# Patient Record
Sex: FEMALE | Race: WHITE | NOT HISPANIC OR LATINO | Employment: OTHER | URBAN - METROPOLITAN AREA
[De-identification: names, ages, dates, MRNs, and addresses within clinical notes are randomized per-mention and may not be internally consistent; named-entity substitution may affect disease eponyms.]

---

## 2017-03-17 ENCOUNTER — HISTORICAL (OUTPATIENT)
Dept: RADIOLOGY | Facility: HOSPITAL | Age: 77
End: 2017-03-17

## 2017-04-07 ENCOUNTER — HISTORICAL (OUTPATIENT)
Dept: ADMINISTRATIVE | Facility: HOSPITAL | Age: 77
End: 2017-04-07

## 2017-07-18 ENCOUNTER — HISTORICAL (OUTPATIENT)
Dept: RADIOLOGY | Facility: HOSPITAL | Age: 77
End: 2017-07-18

## 2017-10-13 ENCOUNTER — HISTORICAL (OUTPATIENT)
Dept: LAB | Facility: HOSPITAL | Age: 77
End: 2017-10-13

## 2017-10-13 LAB
ABS NEUT (OLG): 5.21 X10(3)/MCL (ref 2.1–9.2)
ALBUMIN SERPL-MCNC: 3.7 GM/DL (ref 3.4–5)
ALBUMIN/GLOB SERPL: 1 {RATIO}
ALP SERPL-CCNC: 72 UNIT/L (ref 45–117)
ALT SERPL-CCNC: 38 UNIT/L (ref 13–56)
AST SERPL-CCNC: 23 UNIT/L (ref 15–37)
BASOPHILS # BLD AUTO: 0.05 X10(3)/MCL (ref 0–0.2)
BASOPHILS NFR BLD AUTO: 0.6 % (ref 0–1)
BILIRUB SERPL-MCNC: 0.8 MG/DL (ref 0.2–1)
BILIRUBIN DIRECT+TOT PNL SERPL-MCNC: 0.2 MG/DL (ref 0–0.2)
BILIRUBIN DIRECT+TOT PNL SERPL-MCNC: 0.6 MG/DL (ref 0–1)
BUN SERPL-MCNC: 22 MG/DL (ref 7–18)
CALCIUM SERPL-MCNC: 9.3 MG/DL (ref 8.5–10.1)
CHLORIDE SERPL-SCNC: 102 MMOL/L (ref 98–107)
CHOLEST SERPL-MCNC: 186 MG/DL (ref 0–200)
CHOLEST/HDLC SERPL: 2.8 {RATIO} (ref 0–4)
CO2 SERPL-SCNC: 33 MMOL/L (ref 21–32)
CREAT SERPL-MCNC: 0.74 MG/DL (ref 0.55–1.02)
EOSINOPHIL # BLD AUTO: 0.37 X10(3)/MCL (ref 0–0.9)
EOSINOPHIL NFR BLD AUTO: 4.4 % (ref 0–6.4)
ERYTHROCYTE [DISTWIDTH] IN BLOOD BY AUTOMATED COUNT: 12.5 % (ref 11.5–17)
GLOBULIN SER-MCNC: 4 GM/DL (ref 2–4)
GLUCOSE SERPL-MCNC: 108 MG/DL (ref 74–106)
HCT VFR BLD AUTO: 47.6 % (ref 37–47)
HDLC SERPL-MCNC: 66 MG/DL (ref 35–60)
HGB BLD-MCNC: 16.2 GM/DL (ref 12–16)
IMM GRANULOCYTES # BLD AUTO: 0.03 10*3/UL (ref 0–0.02)
IMM GRANULOCYTES NFR BLD AUTO: 0.4 % (ref 0–0.43)
LDLC SERPL CALC-MCNC: 91 MG/DL (ref 0–129)
LYMPHOCYTES # BLD AUTO: 2.04 X10(3)/MCL (ref 0.6–4.6)
LYMPHOCYTES NFR BLD AUTO: 24.3 % (ref 16–44)
MCH RBC QN AUTO: 30.8 PG (ref 27–31)
MCHC RBC AUTO-ENTMCNC: 34 GM/DL (ref 33–36)
MCV RBC AUTO: 90.5 FL (ref 80–94)
MONOCYTES # BLD AUTO: 0.68 X10(3)/MCL (ref 0.1–1.3)
MONOCYTES NFR BLD AUTO: 8.1 % (ref 4–12.1)
NEUTROPHILS # BLD AUTO: 5.21 X10(3)/MCL (ref 2.1–9.2)
NEUTROPHILS NFR BLD AUTO: 62.2 % (ref 43–73)
NRBC BLD AUTO-RTO: 0 % (ref 0–0.2)
PLATELET # BLD AUTO: 205 X10(3)/MCL (ref 130–400)
PMV BLD AUTO: 9.8 FL (ref 7.4–10.4)
POTASSIUM SERPL-SCNC: 3.6 MMOL/L (ref 3.5–5.1)
PROT SERPL-MCNC: 7.8 GM/DL (ref 6.4–8.2)
RBC # BLD AUTO: 5.26 X10(6)/MCL (ref 4.2–5.4)
SODIUM SERPL-SCNC: 142 MMOL/L (ref 136–145)
TRIGL SERPL-MCNC: 143 MG/DL (ref 30–150)
VLDLC SERPL CALC-MCNC: 29 MG/DL
WBC # SPEC AUTO: 8.4 X10(3)/MCL (ref 4.5–11.5)

## 2018-02-06 ENCOUNTER — HISTORICAL (OUTPATIENT)
Dept: RADIOLOGY | Facility: HOSPITAL | Age: 78
End: 2018-02-06

## 2018-02-23 ENCOUNTER — HISTORICAL (OUTPATIENT)
Dept: LAB | Facility: HOSPITAL | Age: 78
End: 2018-02-23

## 2018-03-20 ENCOUNTER — HISTORICAL (OUTPATIENT)
Dept: RADIOLOGY | Facility: HOSPITAL | Age: 78
End: 2018-03-20

## 2018-05-08 ENCOUNTER — HISTORICAL (OUTPATIENT)
Dept: LAB | Facility: HOSPITAL | Age: 78
End: 2018-05-08

## 2018-05-08 LAB
ABS NEUT (OLG): 8.45 X10(3)/MCL (ref 2.1–9.2)
ALBUMIN SERPL-MCNC: 3.8 GM/DL (ref 3.4–5)
ALBUMIN/GLOB SERPL: 1 {RATIO}
ALP SERPL-CCNC: 65 UNIT/L (ref 45–117)
ALT SERPL-CCNC: 37 UNIT/L (ref 13–56)
AST SERPL-CCNC: 20 UNIT/L (ref 15–37)
BASOPHILS # BLD AUTO: 0.07 X10(3)/MCL (ref 0–0.2)
BASOPHILS NFR BLD AUTO: 0.5 % (ref 0–1)
BILIRUB SERPL-MCNC: 0.5 MG/DL (ref 0.2–1)
BILIRUBIN DIRECT+TOT PNL SERPL-MCNC: 0.1 MG/DL (ref 0–0.2)
BILIRUBIN DIRECT+TOT PNL SERPL-MCNC: 0.4 MG/DL (ref 0–1)
BUN SERPL-MCNC: 27 MG/DL (ref 7–18)
CALCIUM SERPL-MCNC: 9.4 MG/DL (ref 8.5–10.1)
CHLORIDE SERPL-SCNC: 103 MMOL/L (ref 98–107)
CHOLEST SERPL-MCNC: 184 MG/DL (ref 0–200)
CHOLEST/HDLC SERPL: 2.5 {RATIO} (ref 0–4)
CO2 SERPL-SCNC: 33 MMOL/L (ref 21–32)
CREAT SERPL-MCNC: 0.87 MG/DL (ref 0.55–1.02)
EOSINOPHIL # BLD AUTO: 0.26 X10(3)/MCL (ref 0–0.9)
EOSINOPHIL NFR BLD AUTO: 1.9 % (ref 0–6.4)
ERYTHROCYTE [DISTWIDTH] IN BLOOD BY AUTOMATED COUNT: 12.9 % (ref 11.5–17)
GLOBULIN SER-MCNC: 4 GM/DL (ref 2–4)
GLUCOSE SERPL-MCNC: 89 MG/DL (ref 74–106)
HCT VFR BLD AUTO: 50 % (ref 37–47)
HDLC SERPL-MCNC: 75 MG/DL (ref 35–60)
HGB BLD-MCNC: 16.8 GM/DL (ref 12–16)
IMM GRANULOCYTES # BLD AUTO: 0.05 10*3/UL (ref 0–0.02)
IMM GRANULOCYTES NFR BLD AUTO: 0.4 % (ref 0–0.43)
LDLC SERPL CALC-MCNC: 74 MG/DL (ref 0–129)
LYMPHOCYTES # BLD AUTO: 3.53 X10(3)/MCL (ref 0.6–4.6)
LYMPHOCYTES NFR BLD AUTO: 25.9 % (ref 16–44)
MCH RBC QN AUTO: 31.3 PG (ref 27–31)
MCHC RBC AUTO-ENTMCNC: 33.6 GM/DL (ref 33–36)
MCV RBC AUTO: 93.3 FL (ref 80–94)
MONOCYTES # BLD AUTO: 1.27 X10(3)/MCL (ref 0.1–1.3)
MONOCYTES NFR BLD AUTO: 9.3 % (ref 4–12.1)
NEUTROPHILS # BLD AUTO: 8.45 X10(3)/MCL (ref 2.1–9.2)
NEUTROPHILS NFR BLD AUTO: 62 % (ref 43–73)
NRBC BLD AUTO-RTO: 0 % (ref 0–0.2)
PLATELET # BLD AUTO: 244 X10(3)/MCL (ref 130–400)
PMV BLD AUTO: 9.2 FL (ref 7.4–10.4)
POTASSIUM SERPL-SCNC: 4.2 MMOL/L (ref 3.5–5.1)
PROT SERPL-MCNC: 8.2 GM/DL (ref 6.4–8.2)
RBC # BLD AUTO: 5.36 X10(6)/MCL (ref 4.2–5.4)
SODIUM SERPL-SCNC: 141 MMOL/L (ref 136–145)
TRIGL SERPL-MCNC: 174 MG/DL (ref 30–150)
TSH SERPL-ACNC: 4.01 MIU/ML (ref 0.36–3.74)
VLDLC SERPL CALC-MCNC: 35 MG/DL
WBC # SPEC AUTO: 13.6 X10(3)/MCL (ref 4.5–11.5)

## 2018-07-25 ENCOUNTER — HISTORICAL (OUTPATIENT)
Dept: RADIOLOGY | Facility: HOSPITAL | Age: 78
End: 2018-07-25

## 2018-08-10 ENCOUNTER — HISTORICAL (OUTPATIENT)
Dept: LAB | Facility: HOSPITAL | Age: 78
End: 2018-08-10

## 2018-08-10 LAB
ABS NEUT (OLG): 4.66 X10(3)/MCL (ref 2.1–9.2)
ALBUMIN SERPL-MCNC: 3.7 GM/DL (ref 3.4–5)
ALBUMIN/GLOB SERPL: 1 {RATIO}
ALP SERPL-CCNC: 65 UNIT/L (ref 45–117)
ALT SERPL-CCNC: 52 UNIT/L (ref 13–56)
AST SERPL-CCNC: 48 UNIT/L (ref 15–37)
BASOPHILS # BLD AUTO: 0.06 X10(3)/MCL (ref 0–0.2)
BASOPHILS NFR BLD AUTO: 0.8 % (ref 0–1)
BILIRUB SERPL-MCNC: 0.8 MG/DL (ref 0.2–1)
BILIRUBIN DIRECT+TOT PNL SERPL-MCNC: 0.2 MG/DL (ref 0–0.2)
BILIRUBIN DIRECT+TOT PNL SERPL-MCNC: 0.6 MG/DL (ref 0–1)
BUN SERPL-MCNC: 24 MG/DL (ref 7–18)
CALCIUM SERPL-MCNC: 9.1 MG/DL (ref 8.5–10.1)
CHLORIDE SERPL-SCNC: 103 MMOL/L (ref 98–107)
CHOLEST SERPL-MCNC: 179 MG/DL (ref 0–200)
CHOLEST/HDLC SERPL: 2.8 {RATIO} (ref 0–4)
CO2 SERPL-SCNC: 32 MMOL/L (ref 21–32)
CREAT SERPL-MCNC: 0.7 MG/DL (ref 0.55–1.02)
EOSINOPHIL # BLD AUTO: 0.44 X10(3)/MCL (ref 0–0.9)
EOSINOPHIL NFR BLD AUTO: 5.7 % (ref 0–6.4)
ERYTHROCYTE [DISTWIDTH] IN BLOOD BY AUTOMATED COUNT: 13 % (ref 11.5–17)
GLOBULIN SER-MCNC: 4 GM/DL (ref 2–4)
GLUCOSE SERPL-MCNC: 117 MG/DL (ref 74–106)
HCT VFR BLD AUTO: 46.8 % (ref 37–47)
HDLC SERPL-MCNC: 63 MG/DL (ref 35–60)
HGB BLD-MCNC: 15.7 GM/DL (ref 12–16)
IMM GRANULOCYTES # BLD AUTO: 0.02 10*3/UL (ref 0–0.02)
IMM GRANULOCYTES NFR BLD AUTO: 0.3 % (ref 0–0.43)
LDLC SERPL CALC-MCNC: 90 MG/DL (ref 0–129)
LYMPHOCYTES # BLD AUTO: 1.93 X10(3)/MCL (ref 0.6–4.6)
LYMPHOCYTES NFR BLD AUTO: 24.9 % (ref 16–44)
MCH RBC QN AUTO: 30.7 PG (ref 27–31)
MCHC RBC AUTO-ENTMCNC: 33.5 GM/DL (ref 33–36)
MCV RBC AUTO: 91.4 FL (ref 80–94)
MONOCYTES # BLD AUTO: 0.65 X10(3)/MCL (ref 0.1–1.3)
MONOCYTES NFR BLD AUTO: 8.4 % (ref 4–12.1)
NEUTROPHILS # BLD AUTO: 4.66 X10(3)/MCL (ref 2.1–9.2)
NEUTROPHILS NFR BLD AUTO: 59.9 % (ref 43–73)
NRBC BLD AUTO-RTO: 0 % (ref 0–0.2)
PLATELET # BLD AUTO: 210 X10(3)/MCL (ref 130–400)
PMV BLD AUTO: 9.6 FL (ref 7.4–10.4)
POTASSIUM SERPL-SCNC: 3.6 MMOL/L (ref 3.5–5.1)
PROT SERPL-MCNC: 8 GM/DL (ref 6.4–8.2)
RBC # BLD AUTO: 5.12 X10(6)/MCL (ref 4.2–5.4)
SODIUM SERPL-SCNC: 139 MMOL/L (ref 136–145)
TRIGL SERPL-MCNC: 130 MG/DL (ref 30–150)
TSH SERPL-ACNC: 2.53 MIU/ML (ref 0.36–3.74)
VLDLC SERPL CALC-MCNC: 26 MG/DL
WBC # SPEC AUTO: 7.8 X10(3)/MCL (ref 4.5–11.5)

## 2018-08-17 ENCOUNTER — HISTORICAL (OUTPATIENT)
Dept: RADIOLOGY | Facility: HOSPITAL | Age: 78
End: 2018-08-17

## 2019-06-12 ENCOUNTER — HISTORICAL (OUTPATIENT)
Dept: ADMINISTRATIVE | Facility: HOSPITAL | Age: 79
End: 2019-06-12

## 2019-06-14 ENCOUNTER — HISTORICAL (OUTPATIENT)
Dept: ADMINISTRATIVE | Facility: HOSPITAL | Age: 79
End: 2019-06-14

## 2019-06-25 ENCOUNTER — HISTORICAL (OUTPATIENT)
Dept: LAB | Facility: HOSPITAL | Age: 79
End: 2019-06-25

## 2019-06-25 LAB
ABS NEUT (OLG): 4.15 X10(3)/MCL (ref 2.1–9.2)
ALBUMIN SERPL-MCNC: 3.5 GM/DL (ref 3.4–5)
ALBUMIN/GLOB SERPL: 0.9 RATIO (ref 1–2)
ALP SERPL-CCNC: 62 UNIT/L (ref 45–117)
ALT SERPL-CCNC: 30 UNIT/L (ref 13–56)
AST SERPL-CCNC: 23 UNIT/L (ref 15–37)
BASOPHILS # BLD AUTO: 0.04 X10(3)/MCL (ref 0–0.2)
BASOPHILS NFR BLD AUTO: 0.6 % (ref 0–1)
BILIRUB SERPL-MCNC: 0.6 MG/DL (ref 0.2–1)
BILIRUBIN DIRECT+TOT PNL SERPL-MCNC: 0.14 MG/DL (ref 0–0.2)
BILIRUBIN DIRECT+TOT PNL SERPL-MCNC: 0.46 MG/DL (ref 0–1)
BUN SERPL-MCNC: 18 MG/DL (ref 7–18)
CALCIUM SERPL-MCNC: 9.4 MG/DL (ref 8.5–10.1)
CHLORIDE SERPL-SCNC: 103 MMOL/L (ref 98–107)
CHOLEST SERPL-MCNC: 184 MG/DL (ref 0–199)
CHOLEST/HDLC SERPL: 3 MG/DL (ref 0–8)
CO2 SERPL-SCNC: 33 MMOL/L (ref 21–32)
CREAT SERPL-MCNC: 0.74 MG/DL (ref 0.55–1.02)
EOSINOPHIL # BLD AUTO: 0.38 X10(3)/MCL (ref 0–0.9)
EOSINOPHIL NFR BLD AUTO: 5.3 % (ref 0–6.4)
ERYTHROCYTE [DISTWIDTH] IN BLOOD BY AUTOMATED COUNT: 12.9 % (ref 11.5–17)
GLOBULIN SER-MCNC: 4 GM/DL (ref 2–4)
GLUCOSE SERPL-MCNC: 112 MG/DL (ref 74–106)
HCT VFR BLD AUTO: 44.4 % (ref 37–47)
HDLC SERPL-MCNC: 58 MG/DL
HGB BLD-MCNC: 15 GM/DL (ref 12–16)
IMM GRANULOCYTES # BLD AUTO: 0.02 10*3/UL (ref 0–0.02)
IMM GRANULOCYTES NFR BLD AUTO: 0.3 % (ref 0–0.43)
LDLC SERPL CALC-MCNC: 90 MG/DL (ref 0–129)
LYMPHOCYTES # BLD AUTO: 1.95 X10(3)/MCL (ref 0.6–4.6)
LYMPHOCYTES NFR BLD AUTO: 27.4 % (ref 16–44)
MCH RBC QN AUTO: 31.3 PG (ref 27–31)
MCHC RBC AUTO-ENTMCNC: 33.8 GM/DL (ref 33–36)
MCV RBC AUTO: 92.7 FL (ref 80–94)
MONOCYTES # BLD AUTO: 0.58 X10(3)/MCL (ref 0.1–1.3)
MONOCYTES NFR BLD AUTO: 8.1 % (ref 4–12.1)
NEUTROPHILS # BLD AUTO: 4.15 X10(3)/MCL (ref 2.1–9.2)
NEUTROPHILS NFR BLD AUTO: 58.3 % (ref 43–73)
NRBC BLD AUTO-RTO: 0 % (ref 0–0.2)
PLATELET # BLD AUTO: 196 X10(3)/MCL (ref 130–400)
PMV BLD AUTO: 9.3 FL (ref 7.4–10.4)
POTASSIUM SERPL-SCNC: 4 MMOL/L (ref 3.5–5.1)
PROT SERPL-MCNC: 7.6 GM/DL (ref 6.4–8.2)
RBC # BLD AUTO: 4.79 X10(6)/MCL (ref 4.2–5.4)
SODIUM SERPL-SCNC: 142 MMOL/L (ref 136–145)
TRIGL SERPL-MCNC: 181 MG/DL (ref 0–149)
TSH SERPL-ACNC: 2.54 MIU/ML (ref 0.36–3.74)
VLDLC SERPL CALC-MCNC: 36 MG/DL
WBC # SPEC AUTO: 7.1 X10(3)/MCL (ref 4.5–11.5)

## 2019-07-30 ENCOUNTER — HISTORICAL (OUTPATIENT)
Dept: RADIOLOGY | Facility: HOSPITAL | Age: 79
End: 2019-07-30

## 2019-09-04 ENCOUNTER — HISTORICAL (OUTPATIENT)
Dept: LAB | Facility: HOSPITAL | Age: 79
End: 2019-09-04

## 2019-09-04 LAB — URATE SERPL-MCNC: 7.5 MG/DL (ref 2.6–6)

## 2019-10-11 ENCOUNTER — HISTORICAL (OUTPATIENT)
Dept: LAB | Facility: HOSPITAL | Age: 79
End: 2019-10-11

## 2019-10-11 LAB
BUN SERPL-MCNC: 18 MG/DL (ref 7–18)
CALCIUM SERPL-MCNC: 9.7 MG/DL (ref 8.5–10.1)
CHLORIDE SERPL-SCNC: 104 MMOL/L (ref 98–107)
CHOLEST SERPL-MCNC: 212 MG/DL (ref 0–199)
CHOLEST/HDLC SERPL: 3 MG/DL (ref 0–8)
CO2 SERPL-SCNC: 34 MMOL/L (ref 21–32)
CREAT SERPL-MCNC: 0.82 MG/DL (ref 0.55–1.02)
CREAT/UREA NIT SERPL: 22
GLUCOSE SERPL-MCNC: 119 MG/DL (ref 74–106)
HDLC SERPL-MCNC: 68 MG/DL
LDLC SERPL CALC-MCNC: 112 MG/DL (ref 0–129)
POTASSIUM SERPL-SCNC: 4.1 MMOL/L (ref 3.5–5.1)
SODIUM SERPL-SCNC: 141 MMOL/L (ref 136–145)
TRIGL SERPL-MCNC: 160 MG/DL (ref 0–149)
VLDLC SERPL CALC-MCNC: 32 MG/DL

## 2020-01-24 ENCOUNTER — HISTORICAL (OUTPATIENT)
Dept: LAB | Facility: HOSPITAL | Age: 80
End: 2020-01-24

## 2020-01-24 LAB
ABS NEUT (OLG): 3.98 X10(3)/MCL (ref 2.1–9.2)
ALBUMIN SERPL-MCNC: 3.8 GM/DL (ref 3.4–5)
ALBUMIN/GLOB SERPL: 1 RATIO (ref 1–2)
ALP SERPL-CCNC: 64 UNIT/L (ref 45–117)
ALT SERPL-CCNC: 38 UNIT/L (ref 13–56)
AST SERPL-CCNC: 26 UNIT/L (ref 15–37)
BASOPHILS # BLD AUTO: 0.07 X10(3)/MCL (ref 0–0.2)
BASOPHILS NFR BLD AUTO: 0.9 % (ref 0–1)
BILIRUB SERPL-MCNC: 0.8 MG/DL (ref 0.2–1)
BILIRUBIN DIRECT+TOT PNL SERPL-MCNC: 0.17 MG/DL (ref 0–0.2)
BILIRUBIN DIRECT+TOT PNL SERPL-MCNC: 0.63 MG/DL (ref 0–1)
BUN SERPL-MCNC: 20 MG/DL (ref 7–18)
CALCIUM SERPL-MCNC: 10 MG/DL (ref 8.5–10.1)
CHLORIDE SERPL-SCNC: 102 MMOL/L (ref 98–107)
CHOLEST SERPL-MCNC: 189 MG/DL (ref 0–199)
CHOLEST/HDLC SERPL: 3 {RATIO}
CO2 SERPL-SCNC: 32 MMOL/L (ref 21–32)
CREAT SERPL-MCNC: 0.79 MG/DL (ref 0.55–1.02)
EOSINOPHIL # BLD AUTO: 0.38 X10(3)/MCL (ref 0–0.9)
EOSINOPHIL NFR BLD AUTO: 5.1 % (ref 0–6.4)
ERYTHROCYTE [DISTWIDTH] IN BLOOD BY AUTOMATED COUNT: 13.1 % (ref 11.5–17)
GLOBULIN SER-MCNC: 4 GM/DL (ref 2–4)
GLUCOSE SERPL-MCNC: 104 MG/DL (ref 74–106)
HCT VFR BLD AUTO: 46.9 % (ref 37–47)
HDLC SERPL-MCNC: 58 MG/DL
HGB BLD-MCNC: 15.7 GM/DL (ref 12–16)
IMM GRANULOCYTES # BLD AUTO: 0.02 10*3/UL (ref 0–0.02)
IMM GRANULOCYTES NFR BLD AUTO: 0.3 % (ref 0–0.43)
LDLC SERPL CALC-MCNC: 90 MG/DL (ref 0–129)
LYMPHOCYTES # BLD AUTO: 2.36 X10(3)/MCL (ref 0.6–4.6)
LYMPHOCYTES NFR BLD AUTO: 31.7 % (ref 16–44)
MCH RBC QN AUTO: 30.5 PG (ref 27–31)
MCHC RBC AUTO-ENTMCNC: 33.5 GM/DL (ref 33–36)
MCV RBC AUTO: 91.1 FL (ref 80–94)
MONOCYTES # BLD AUTO: 0.63 X10(3)/MCL (ref 0.1–1.3)
MONOCYTES NFR BLD AUTO: 8.5 % (ref 4–12.1)
NEUTROPHILS # BLD AUTO: 3.98 X10(3)/MCL (ref 2.1–9.2)
NEUTROPHILS NFR BLD AUTO: 53.5 % (ref 43–73)
NRBC BLD AUTO-RTO: 0 % (ref 0–0.2)
PLATELET # BLD AUTO: 200 X10(3)/MCL (ref 130–400)
PMV BLD AUTO: 10.1 FL (ref 7.4–10.4)
POTASSIUM SERPL-SCNC: 4 MMOL/L (ref 3.5–5.1)
PROT SERPL-MCNC: 7.9 GM/DL (ref 6.4–8.2)
RBC # BLD AUTO: 5.15 X10(6)/MCL (ref 4.2–5.4)
SODIUM SERPL-SCNC: 139 MMOL/L (ref 136–145)
TRIGL SERPL-MCNC: 205 MG/DL (ref 0–149)
VLDLC SERPL CALC-MCNC: 41 MG/DL
WBC # SPEC AUTO: 7.4 X10(3)/MCL (ref 4.5–11.5)

## 2020-05-07 ENCOUNTER — HISTORICAL (OUTPATIENT)
Dept: LAB | Facility: HOSPITAL | Age: 80
End: 2020-05-07

## 2020-05-12 ENCOUNTER — HISTORICAL (OUTPATIENT)
Dept: ADMINISTRATIVE | Facility: HOSPITAL | Age: 80
End: 2020-05-12

## 2020-05-20 ENCOUNTER — HISTORICAL (OUTPATIENT)
Dept: LAB | Facility: HOSPITAL | Age: 80
End: 2020-05-20

## 2020-05-20 LAB
ABS NEUT (OLG): 6.77 X10(3)/MCL (ref 2.1–9.2)
ALBUMIN SERPL-MCNC: 3.8 GM/DL (ref 3.4–4.8)
ALBUMIN/GLOB SERPL: 0.9 RATIO (ref 1.1–2)
ALP SERPL-CCNC: 68 UNIT/L (ref 40–150)
ALT SERPL-CCNC: 24 UNIT/L (ref 0–55)
AST SERPL-CCNC: 20 UNIT/L (ref 5–34)
BASOPHILS # BLD AUTO: 0.07 X10(3)/MCL (ref 0–0.2)
BASOPHILS NFR BLD AUTO: 0.7 % (ref 0–1)
BILIRUB SERPL-MCNC: 1 MG/DL (ref 0.2–1.2)
BILIRUBIN DIRECT+TOT PNL SERPL-MCNC: 0.4 MG/DL (ref 0–0.5)
BILIRUBIN DIRECT+TOT PNL SERPL-MCNC: 0.6 MG/DL (ref 0–0.8)
BUN SERPL-MCNC: 25.4 MG/DL (ref 9.8–20.1)
CALCIUM SERPL-MCNC: 10.7 MG/DL (ref 8.4–10.2)
CHLORIDE SERPL-SCNC: 100 MMOL/L (ref 98–107)
CHOLEST SERPL-MCNC: 181 MG/DL
CHOLEST/HDLC SERPL: 3 {RATIO} (ref 0–5)
CO2 SERPL-SCNC: 33 MMOL/L (ref 23–31)
CREAT SERPL-MCNC: 0.78 MG/DL (ref 0.57–1.11)
EOSINOPHIL # BLD AUTO: 0.33 X10(3)/MCL (ref 0–0.9)
EOSINOPHIL NFR BLD AUTO: 3.1 % (ref 0–6.4)
ERYTHROCYTE [DISTWIDTH] IN BLOOD BY AUTOMATED COUNT: 13 % (ref 11.5–17)
GLOBULIN SER-MCNC: 4.1 GM/DL (ref 2.4–3.5)
GLUCOSE SERPL-MCNC: 114 MG/DL (ref 82–115)
HCT VFR BLD AUTO: 45.7 % (ref 37–47)
HDLC SERPL-MCNC: 54 MG/DL (ref 40–60)
HGB BLD-MCNC: 15.3 GM/DL (ref 12–16)
IMM GRANULOCYTES # BLD AUTO: 0.04 10*3/UL (ref 0–0.02)
IMM GRANULOCYTES NFR BLD AUTO: 0.4 % (ref 0–0.43)
LDLC SERPL CALC-MCNC: 98 MG/DL (ref 50–140)
LYMPHOCYTES # BLD AUTO: 2.87 X10(3)/MCL (ref 0.6–4.6)
LYMPHOCYTES NFR BLD AUTO: 26.7 % (ref 16–44)
MCH RBC QN AUTO: 31 PG (ref 27–31)
MCHC RBC AUTO-ENTMCNC: 33.5 GM/DL (ref 33–36)
MCV RBC AUTO: 92.7 FL (ref 80–94)
MONOCYTES # BLD AUTO: 0.66 X10(3)/MCL (ref 0.1–1.3)
MONOCYTES NFR BLD AUTO: 6.1 % (ref 4–12.1)
NEUTROPHILS # BLD AUTO: 6.77 X10(3)/MCL (ref 2.1–9.2)
NEUTROPHILS NFR BLD AUTO: 63 % (ref 43–73)
NRBC BLD AUTO-RTO: 0 % (ref 0–0.2)
PLATELET # BLD AUTO: 269 X10(3)/MCL (ref 130–400)
PMV BLD AUTO: 9.2 FL (ref 7.4–10.4)
POTASSIUM SERPL-SCNC: 4.2 MMOL/L (ref 3.5–5.1)
PROT SERPL-MCNC: 7.9 GM/DL (ref 5.8–7.6)
RBC # BLD AUTO: 4.93 X10(6)/MCL (ref 4.2–5.4)
SODIUM SERPL-SCNC: 141 MMOL/L (ref 136–145)
TRIGL SERPL-MCNC: 147 MG/DL (ref 0–150)
VLDLC SERPL CALC-MCNC: 29 MG/DL
WBC # SPEC AUTO: 10.7 X10(3)/MCL (ref 4.5–11.5)

## 2020-06-19 ENCOUNTER — HISTORICAL (OUTPATIENT)
Dept: LAB | Facility: HOSPITAL | Age: 80
End: 2020-06-19

## 2020-06-19 LAB
ABS NEUT (OLG): 4.87 X10(3)/MCL (ref 2.1–9.2)
ALBUMIN SERPL-MCNC: 4 GM/DL (ref 3.4–4.8)
ALBUMIN/GLOB SERPL: 1.1 RATIO (ref 1.1–2)
ALP SERPL-CCNC: 66 UNIT/L (ref 40–150)
ALT SERPL-CCNC: 22 UNIT/L (ref 0–55)
AST SERPL-CCNC: 22 UNIT/L (ref 5–34)
BASOPHILS # BLD AUTO: 0.06 X10(3)/MCL (ref 0–0.2)
BASOPHILS NFR BLD AUTO: 0.7 % (ref 0–1)
BILIRUB SERPL-MCNC: 1 MG/DL (ref 0.2–1.2)
BILIRUBIN DIRECT+TOT PNL SERPL-MCNC: 0.4 MG/DL (ref 0–0.5)
BILIRUBIN DIRECT+TOT PNL SERPL-MCNC: 0.6 MG/DL (ref 0–0.8)
BUN SERPL-MCNC: 18.6 MG/DL (ref 9.8–20.1)
CALCIUM SERPL-MCNC: 10.8 MG/DL (ref 8.4–10.2)
CHLORIDE SERPL-SCNC: 100 MMOL/L (ref 98–107)
CHOLEST SERPL-MCNC: 232 MG/DL
CHOLEST/HDLC SERPL: 4 {RATIO} (ref 0–5)
CO2 SERPL-SCNC: 30 MMOL/L (ref 23–31)
CREAT SERPL-MCNC: 0.77 MG/DL (ref 0.57–1.11)
EOSINOPHIL # BLD AUTO: 0.37 X10(3)/MCL (ref 0–0.9)
EOSINOPHIL NFR BLD AUTO: 4.2 % (ref 0–6.4)
ERYTHROCYTE [DISTWIDTH] IN BLOOD BY AUTOMATED COUNT: 12.8 % (ref 11.5–17)
GLOBULIN SER-MCNC: 3.8 GM/DL (ref 2.4–3.5)
GLUCOSE SERPL-MCNC: 104 MG/DL (ref 82–115)
HCT VFR BLD AUTO: 47.2 % (ref 37–47)
HDLC SERPL-MCNC: 58 MG/DL (ref 40–60)
HGB BLD-MCNC: 16 GM/DL (ref 12–16)
IMM GRANULOCYTES # BLD AUTO: 0.02 10*3/UL (ref 0–0.02)
IMM GRANULOCYTES NFR BLD AUTO: 0.2 % (ref 0–0.43)
LDLC SERPL CALC-MCNC: 146 MG/DL (ref 50–140)
LYMPHOCYTES # BLD AUTO: 2.89 X10(3)/MCL (ref 0.6–4.6)
LYMPHOCYTES NFR BLD AUTO: 32.6 % (ref 16–44)
MCH RBC QN AUTO: 31.3 PG (ref 27–31)
MCHC RBC AUTO-ENTMCNC: 33.9 GM/DL (ref 33–36)
MCV RBC AUTO: 92.4 FL (ref 80–94)
MONOCYTES # BLD AUTO: 0.66 X10(3)/MCL (ref 0.1–1.3)
MONOCYTES NFR BLD AUTO: 7.4 % (ref 4–12.1)
NEUTROPHILS # BLD AUTO: 4.87 X10(3)/MCL (ref 2.1–9.2)
NEUTROPHILS NFR BLD AUTO: 54.9 % (ref 43–73)
NRBC BLD AUTO-RTO: 0 % (ref 0–0.2)
PLATELET # BLD AUTO: 244 X10(3)/MCL (ref 130–400)
PMV BLD AUTO: 9.2 FL (ref 7.4–10.4)
POTASSIUM SERPL-SCNC: 3.6 MMOL/L (ref 3.5–5.1)
PROT SERPL-MCNC: 7.8 GM/DL (ref 5.8–7.6)
RBC # BLD AUTO: 5.11 X10(6)/MCL (ref 4.2–5.4)
SODIUM SERPL-SCNC: 142 MMOL/L (ref 136–145)
TRIGL SERPL-MCNC: 139 MG/DL (ref 0–150)
VLDLC SERPL CALC-MCNC: 28 MG/DL
WBC # SPEC AUTO: 8.9 X10(3)/MCL (ref 4.5–11.5)

## 2020-07-21 ENCOUNTER — HISTORICAL (OUTPATIENT)
Dept: LAB | Facility: HOSPITAL | Age: 80
End: 2020-07-21

## 2020-07-21 LAB
APPEARANCE, UA: CLEAR
BILIRUB UR QL STRIP: NEGATIVE
COLOR UR: NORMAL
GLUCOSE (UA): NEGATIVE
HGB UR QL STRIP: NEGATIVE
KETONES UR QL STRIP: NEGATIVE
LEUKOCYTE ESTERASE UR QL STRIP: NEGATIVE
NITRITE UR QL STRIP: NEGATIVE
PH UR STRIP: 6 [PH] (ref 5–7)
PROT UR QL STRIP: NEGATIVE
SP GR UR STRIP: 1.02 (ref 1–1.03)
UROBILINOGEN UR STRIP-ACNC: NEGATIVE

## 2020-08-04 ENCOUNTER — HISTORICAL (OUTPATIENT)
Dept: RADIOLOGY | Facility: HOSPITAL | Age: 80
End: 2020-08-04

## 2020-09-16 ENCOUNTER — HISTORICAL (OUTPATIENT)
Dept: LAB | Facility: HOSPITAL | Age: 80
End: 2020-09-16

## 2020-09-16 LAB
ABS NEUT (OLG): 4.77 X10(3)/MCL (ref 2.1–9.2)
ALBUMIN SERPL-MCNC: 4 GM/DL (ref 3.4–4.8)
ALBUMIN/GLOB SERPL: 1.1 RATIO (ref 1.1–2)
ALP SERPL-CCNC: 56 UNIT/L (ref 40–150)
ALT SERPL-CCNC: 26 UNIT/L (ref 0–55)
AST SERPL-CCNC: 27 UNIT/L (ref 5–34)
BASOPHILS # BLD AUTO: 0.06 X10(3)/MCL (ref 0–0.2)
BASOPHILS NFR BLD AUTO: 0.7 % (ref 0–1)
BILIRUB SERPL-MCNC: 0.8 MG/DL (ref 0.2–1.2)
BILIRUBIN DIRECT+TOT PNL SERPL-MCNC: 0.3 MG/DL (ref 0–0.5)
BILIRUBIN DIRECT+TOT PNL SERPL-MCNC: 0.5 MG/DL (ref 0–0.8)
BUN SERPL-MCNC: 20.3 MG/DL (ref 9.8–20.1)
CALCIUM SERPL-MCNC: 10.7 MG/DL (ref 8.4–10.2)
CHLORIDE SERPL-SCNC: 102 MMOL/L (ref 98–107)
CHOLEST SERPL-MCNC: 209 MG/DL
CHOLEST/HDLC SERPL: 3 {RATIO} (ref 0–5)
CO2 SERPL-SCNC: 30 MMOL/L (ref 23–31)
CREAT SERPL-MCNC: 0.79 MG/DL (ref 0.57–1.11)
EOSINOPHIL # BLD AUTO: 0.26 X10(3)/MCL (ref 0–0.9)
EOSINOPHIL NFR BLD AUTO: 3.1 % (ref 0–6.4)
ERYTHROCYTE [DISTWIDTH] IN BLOOD BY AUTOMATED COUNT: 13.3 % (ref 11.5–17)
FT4I SERPL CALC-MCNC: 2.53 (ref 2.6–3.6)
GLOBULIN SER-MCNC: 3.6 GM/DL (ref 2.4–3.5)
GLUCOSE SERPL-MCNC: 102 MG/DL (ref 82–115)
HCT VFR BLD AUTO: 48.3 % (ref 37–47)
HDLC SERPL-MCNC: 60 MG/DL (ref 40–60)
HGB BLD-MCNC: 16.2 GM/DL (ref 12–16)
IMM GRANULOCYTES # BLD AUTO: 0.02 10*3/UL (ref 0–0.02)
IMM GRANULOCYTES NFR BLD AUTO: 0.2 % (ref 0–0.43)
LDLC SERPL CALC-MCNC: 117 MG/DL (ref 50–140)
LYMPHOCYTES # BLD AUTO: 2.79 X10(3)/MCL (ref 0.6–4.6)
LYMPHOCYTES NFR BLD AUTO: 32.8 % (ref 16–44)
MCH RBC QN AUTO: 31.7 PG (ref 27–31)
MCHC RBC AUTO-ENTMCNC: 33.5 GM/DL (ref 33–36)
MCV RBC AUTO: 94.5 FL (ref 80–94)
MONOCYTES # BLD AUTO: 0.6 X10(3)/MCL (ref 0.1–1.3)
MONOCYTES NFR BLD AUTO: 7.1 % (ref 4–12.1)
NEUTROPHILS # BLD AUTO: 4.77 X10(3)/MCL (ref 2.1–9.2)
NEUTROPHILS NFR BLD AUTO: 56.1 % (ref 43–73)
NRBC BLD AUTO-RTO: 0 % (ref 0–0.2)
PLATELET # BLD AUTO: 223 X10(3)/MCL (ref 130–400)
PMV BLD AUTO: 9.4 FL (ref 7.4–10.4)
POTASSIUM SERPL-SCNC: 4 MMOL/L (ref 3.5–5.1)
PROT SERPL-MCNC: 7.6 GM/DL (ref 5.8–7.6)
RBC # BLD AUTO: 5.11 X10(6)/MCL (ref 4.2–5.4)
SODIUM SERPL-SCNC: 143 MMOL/L (ref 136–145)
T3RU NFR SERPL: 34.52 % (ref 31–39)
T4 SERPL-MCNC: 7.32 UG/DL (ref 4.87–11.72)
TRIGL SERPL-MCNC: 158 MG/DL (ref 0–150)
TSH SERPL-ACNC: 2.63 UIU/ML (ref 0.35–4.94)
VLDLC SERPL CALC-MCNC: 32 MG/DL
WBC # SPEC AUTO: 8.5 X10(3)/MCL (ref 4.5–11.5)

## 2020-11-02 ENCOUNTER — HISTORICAL (OUTPATIENT)
Dept: ADMINISTRATIVE | Facility: HOSPITAL | Age: 80
End: 2020-11-02

## 2020-11-02 LAB
APPEARANCE, UA: CLEAR
BACTERIA SPEC CULT: ABNORMAL /HPF
BILIRUB UR QL STRIP: NEGATIVE
COLOR UR: YELLOW
GLUCOSE (UA): NEGATIVE
HGB UR QL STRIP: NEGATIVE
KETONES UR QL STRIP: NEGATIVE
LEUKOCYTE ESTERASE UR QL STRIP: ABNORMAL
NITRITE UR QL STRIP: NEGATIVE
PH UR STRIP: 5.5 [PH] (ref 5–9)
PROT UR QL STRIP: NEGATIVE
RBC #/AREA URNS HPF: ABNORMAL /[HPF]
SP GR UR STRIP: 1.02 (ref 1–1.03)
SQUAMOUS EPITHELIAL, UA: ABNORMAL
UROBILINOGEN UR STRIP-ACNC: 0.2
WBC #/AREA URNS HPF: ABNORMAL /[HPF]

## 2020-12-17 ENCOUNTER — HISTORICAL (OUTPATIENT)
Dept: ADMINISTRATIVE | Facility: HOSPITAL | Age: 80
End: 2020-12-17

## 2020-12-17 LAB
ABS NEUT (OLG): 5.09 X10(3)/MCL (ref 2.1–9.2)
ALBUMIN SERPL-MCNC: 4.3 GM/DL (ref 3.4–4.8)
ALBUMIN/GLOB SERPL: 1.2 RATIO (ref 1.1–2)
ALP SERPL-CCNC: 73 UNIT/L (ref 40–150)
ALT SERPL-CCNC: 27 UNIT/L (ref 0–55)
APPEARANCE, UA: ABNORMAL
AST SERPL-CCNC: 23 UNIT/L (ref 5–34)
BACTERIA SPEC CULT: ABNORMAL /HPF
BASOPHILS # BLD AUTO: 0.1 X10(3)/MCL (ref 0–0.2)
BASOPHILS NFR BLD AUTO: 1 %
BILIRUB SERPL-MCNC: 0.8 MG/DL
BILIRUB UR QL STRIP: NEGATIVE
BILIRUBIN DIRECT+TOT PNL SERPL-MCNC: 0.3 MG/DL (ref 0–0.5)
BILIRUBIN DIRECT+TOT PNL SERPL-MCNC: 0.5 MG/DL (ref 0–0.8)
BUN SERPL-MCNC: 24.3 MG/DL (ref 9.8–20.1)
CALCIUM SERPL-MCNC: 10.5 MG/DL (ref 8.4–10.2)
CHLORIDE SERPL-SCNC: 101 MMOL/L (ref 98–107)
CHOLEST SERPL-MCNC: 227 MG/DL
CHOLEST/HDLC SERPL: 4 {RATIO} (ref 0–5)
CO2 SERPL-SCNC: 34 MMOL/L (ref 23–31)
COLOR UR: ABNORMAL
CREAT SERPL-MCNC: 0.75 MG/DL (ref 0.55–1.02)
DEPRECATED CALCIDIOL+CALCIFEROL SERPL-MC: 96.6 NG/ML (ref 30–80)
EOSINOPHIL # BLD AUTO: 0.4 X10(3)/MCL (ref 0–0.9)
EOSINOPHIL NFR BLD AUTO: 4 %
ERYTHROCYTE [DISTWIDTH] IN BLOOD BY AUTOMATED COUNT: 12.2 % (ref 11.5–17)
GLOBULIN SER-MCNC: 3.5 GM/DL (ref 2.4–3.5)
GLUCOSE (UA): NEGATIVE
GLUCOSE SERPL-MCNC: 97 MG/DL (ref 82–115)
HCT VFR BLD AUTO: 48.6 % (ref 37–47)
HDLC SERPL-MCNC: 57 MG/DL (ref 35–60)
HGB BLD-MCNC: 16.4 GM/DL (ref 12–16)
HGB UR QL STRIP: NEGATIVE
KETONES UR QL STRIP: NEGATIVE
LDLC SERPL CALC-MCNC: 137 MG/DL (ref 50–140)
LEUKOCYTE ESTERASE UR QL STRIP: ABNORMAL
LYMPHOCYTES # BLD AUTO: 2.4 X10(3)/MCL (ref 0.6–4.6)
LYMPHOCYTES NFR BLD AUTO: 28 %
MCH RBC QN AUTO: 32.9 PG (ref 27–31)
MCHC RBC AUTO-ENTMCNC: 33.7 GM/DL (ref 33–36)
MCV RBC AUTO: 97.6 FL (ref 80–94)
MONOCYTES # BLD AUTO: 0.7 X10(3)/MCL (ref 0.1–1.3)
MONOCYTES NFR BLD AUTO: 8 %
NEUTROPHILS # BLD AUTO: 5.09 X10(3)/MCL (ref 2.1–9.2)
NEUTROPHILS NFR BLD AUTO: 58 %
NITRITE UR QL STRIP: NEGATIVE
PH UR STRIP: 5.5 [PH] (ref 5–9)
PLATELET # BLD AUTO: 219 X10(3)/MCL (ref 130–400)
PMV BLD AUTO: 10 FL (ref 9.4–12.4)
POTASSIUM SERPL-SCNC: 5.1 MMOL/L (ref 3.5–5.1)
PROT SERPL-MCNC: 7.8 GM/DL (ref 5.8–7.6)
PROT UR QL STRIP: NEGATIVE
RBC # BLD AUTO: 4.98 X10(6)/MCL (ref 4.2–5.4)
RBC #/AREA URNS HPF: ABNORMAL /[HPF]
SODIUM SERPL-SCNC: 143 MMOL/L (ref 136–145)
SP GR UR STRIP: 1.03 (ref 1–1.03)
SQUAMOUS EPITHELIAL, UA: ABNORMAL
TRIGL SERPL-MCNC: 167 MG/DL (ref 37–140)
UROBILINOGEN UR STRIP-ACNC: 0.2
VLDLC SERPL CALC-MCNC: 33 MG/DL
WBC # SPEC AUTO: 8.7 X10(3)/MCL (ref 4.5–11.5)
WBC #/AREA URNS HPF: ABNORMAL /HPF

## 2021-03-10 ENCOUNTER — HISTORICAL (OUTPATIENT)
Dept: ADMINISTRATIVE | Facility: HOSPITAL | Age: 81
End: 2021-03-10

## 2021-03-10 LAB
ABS NEUT (OLG): 4.05 X10(3)/MCL (ref 2.1–9.2)
ALBUMIN SERPL-MCNC: 3.8 GM/DL (ref 3.4–4.8)
ALBUMIN/GLOB SERPL: 1.2 RATIO (ref 1.1–2)
ALP SERPL-CCNC: 75 UNIT/L (ref 40–150)
ALT SERPL-CCNC: 32 UNIT/L (ref 0–55)
APPEARANCE, UA: CLEAR
AST SERPL-CCNC: 29 UNIT/L (ref 5–34)
BACTERIA #/AREA URNS AUTO: ABNORMAL /HPF
BASOPHILS # BLD AUTO: 0.1 X10(3)/MCL (ref 0–0.2)
BASOPHILS NFR BLD AUTO: 1 %
BILIRUB SERPL-MCNC: 0.7 MG/DL
BILIRUB UR QL STRIP: NEGATIVE
BILIRUBIN DIRECT+TOT PNL SERPL-MCNC: 0.3 MG/DL (ref 0–0.5)
BILIRUBIN DIRECT+TOT PNL SERPL-MCNC: 0.4 MG/DL (ref 0–0.8)
BUN SERPL-MCNC: 17.6 MG/DL (ref 9.8–20.1)
CALCIUM SERPL-MCNC: 9 MG/DL (ref 8.4–10.2)
CHLORIDE SERPL-SCNC: 104 MMOL/L (ref 98–107)
CHOLEST SERPL-MCNC: 197 MG/DL
CHOLEST/HDLC SERPL: 4 {RATIO} (ref 0–5)
CO2 SERPL-SCNC: 28 MMOL/L (ref 23–31)
COLOR UR: YELLOW
CREAT SERPL-MCNC: 0.65 MG/DL (ref 0.55–1.02)
DEPRECATED CALCIDIOL+CALCIFEROL SERPL-MC: 65.4 NG/ML (ref 30–80)
EOSINOPHIL # BLD AUTO: 0.4 X10(3)/MCL (ref 0–0.9)
EOSINOPHIL NFR BLD AUTO: 5 %
ERYTHROCYTE [DISTWIDTH] IN BLOOD BY AUTOMATED COUNT: 12.4 % (ref 11.5–17)
GLOBULIN SER-MCNC: 3.3 GM/DL (ref 2.4–3.5)
GLUCOSE (UA): NEGATIVE
GLUCOSE SERPL-MCNC: 95 MG/DL (ref 82–115)
HCT VFR BLD AUTO: 46.7 % (ref 37–47)
HDLC SERPL-MCNC: 54 MG/DL (ref 35–60)
HGB BLD-MCNC: 15.3 GM/DL (ref 12–16)
HGB UR QL STRIP: NEGATIVE
KETONES UR QL STRIP: NEGATIVE
LDLC SERPL CALC-MCNC: 108 MG/DL (ref 50–140)
LEUKOCYTE ESTERASE UR QL STRIP: ABNORMAL
LYMPHOCYTES # BLD AUTO: 2.3 X10(3)/MCL (ref 0.6–4.6)
LYMPHOCYTES NFR BLD AUTO: 31 %
MCH RBC QN AUTO: 32.8 PG (ref 27–31)
MCHC RBC AUTO-ENTMCNC: 32.8 GM/DL (ref 33–36)
MCV RBC AUTO: 100 FL (ref 80–94)
MONOCYTES # BLD AUTO: 0.6 X10(3)/MCL (ref 0.1–1.3)
MONOCYTES NFR BLD AUTO: 8 %
NEUTROPHILS # BLD AUTO: 4.05 X10(3)/MCL (ref 2.1–9.2)
NEUTROPHILS NFR BLD AUTO: 54 %
NITRITE UR QL STRIP.AUTO: NEGATIVE
PH UR STRIP: 5.5 [PH] (ref 5–9)
PLATELET # BLD AUTO: 190 X10(3)/MCL (ref 130–400)
PMV BLD AUTO: 10.7 FL (ref 9.4–12.4)
POTASSIUM SERPL-SCNC: 4.1 MMOL/L (ref 3.5–5.1)
PROT SERPL-MCNC: 7.1 GM/DL (ref 5.8–7.6)
PROT UR QL STRIP: NEGATIVE
RBC # BLD AUTO: 4.67 X10(6)/MCL (ref 4.2–5.4)
RBC #/AREA URNS HPF: ABNORMAL /[HPF]
SODIUM SERPL-SCNC: 144 MMOL/L (ref 136–145)
SP GR UR STRIP: 1.02 (ref 1–1.03)
SQUAMOUS EPITHELIAL, UA: ABNORMAL
TRIGL SERPL-MCNC: 175 MG/DL (ref 37–140)
UROBILINOGEN UR STRIP-ACNC: 0.2
VLDLC SERPL CALC-MCNC: 35 MG/DL
WBC # SPEC AUTO: 7.5 X10(3)/MCL (ref 4.5–11.5)
WBC #/AREA URNS AUTO: 11 /HPF (ref 0–3)

## 2021-03-15 ENCOUNTER — HISTORICAL (OUTPATIENT)
Dept: ADMINISTRATIVE | Facility: HOSPITAL | Age: 81
End: 2021-03-15

## 2021-10-18 ENCOUNTER — HISTORICAL (OUTPATIENT)
Dept: ADMINISTRATIVE | Facility: HOSPITAL | Age: 81
End: 2021-10-18

## 2021-10-18 LAB
BUN SERPL-MCNC: 16.2 MG/DL (ref 9.8–20.1)
CALCIUM SERPL-MCNC: 9.3 MG/DL (ref 8.7–10.5)
CHLORIDE SERPL-SCNC: 106 MMOL/L (ref 98–107)
CO2 SERPL-SCNC: 27 MMOL/L (ref 23–31)
CREAT SERPL-MCNC: 0.69 MG/DL (ref 0.55–1.02)
CREAT/UREA NIT SERPL: 23
GLUCOSE SERPL-MCNC: 134 MG/DL (ref 82–115)
POTASSIUM SERPL-SCNC: 4.4 MMOL/L (ref 3.5–5.1)
SODIUM SERPL-SCNC: 143 MMOL/L (ref 136–145)

## 2021-10-20 ENCOUNTER — HISTORICAL (OUTPATIENT)
Dept: RADIOLOGY | Facility: HOSPITAL | Age: 81
End: 2021-10-20

## 2021-12-06 ENCOUNTER — HISTORICAL (OUTPATIENT)
Dept: ADMINISTRATIVE | Facility: HOSPITAL | Age: 81
End: 2021-12-06

## 2021-12-06 LAB
BUN SERPL-MCNC: 24.8 MG/DL (ref 9.8–20.1)
CALCIUM SERPL-MCNC: 9.4 MG/DL (ref 8.7–10.5)
CHLORIDE SERPL-SCNC: 100 MMOL/L (ref 98–107)
CO2 SERPL-SCNC: 27 MMOL/L (ref 23–31)
CREAT SERPL-MCNC: 1.31 MG/DL (ref 0.55–1.02)
CREAT/UREA NIT SERPL: 19
GLUCOSE SERPL-MCNC: 83 MG/DL (ref 82–115)
POTASSIUM SERPL-SCNC: 4.4 MMOL/L (ref 3.5–5.1)
SODIUM SERPL-SCNC: 139 MMOL/L (ref 136–145)

## 2021-12-08 ENCOUNTER — HISTORICAL (OUTPATIENT)
Dept: ADMINISTRATIVE | Facility: HOSPITAL | Age: 81
End: 2021-12-08

## 2021-12-22 ENCOUNTER — HISTORICAL (OUTPATIENT)
Dept: ADMINISTRATIVE | Facility: HOSPITAL | Age: 81
End: 2021-12-22

## 2022-01-12 ENCOUNTER — HISTORICAL (OUTPATIENT)
Dept: ADMINISTRATIVE | Facility: HOSPITAL | Age: 82
End: 2022-01-12

## 2022-04-10 ENCOUNTER — HISTORICAL (OUTPATIENT)
Dept: ADMINISTRATIVE | Facility: HOSPITAL | Age: 82
End: 2022-04-10
Payer: MEDICARE

## 2022-04-24 VITALS
OXYGEN SATURATION: 96 % | HEIGHT: 64 IN | DIASTOLIC BLOOD PRESSURE: 58 MMHG | BODY MASS INDEX: 35 KG/M2 | WEIGHT: 205 LBS | SYSTOLIC BLOOD PRESSURE: 128 MMHG

## 2022-04-30 NOTE — OP NOTE
Patient:   Renata Melchor             MRN: [id number]            FIN: [id number]               Age:   81 years     Sex:  Female     :  1940   Associated Diagnoses:   None   Author:   Desmond Thomas MD          Preoperative Diagnosis: Nuclear sclerotic cataract [Left /  eye.    Postoperative Diagnosis: Same.    Anesthesia: Local    Procedure: Phacoemulsification of cataract with posterior chamber implant of the [Left/ eye.    This patient is a [  ] year old who was given a diagnosis of severe cataracts of both eyes with [  ] vision [  ]. The risks and benefits of cataract surgery were explained; the patient was consented and desired to have the surgery done. The patient was given topical anesthesia using 1% Lidocaine Jelly and the patient was prepped and draped in sterile fashion.    The microscope was centered and focused in a temporal position and a super sharp blade was used to make a paracentesis in the corneal limbus. Dispersive Viscoelastic was then placed in the anterior chamber. A 2.4 mm keratome blade was used to enter the anterior chamber in a self-sealing type technique. The cystatome blade was then used to initiate a capsulorrhexis which was completed 360 degrees with Utrata forceps. BSS in an AC cannula was then used to perform hydrodissection and hydrodilineation. Phacoemulsification was then accomplished by creating a deep groove down the middle of the nucleus, then  it into 2 halves with the phacotip and the Mata chopper and finishing the removal with a stop and chop technique.  The cortex was then removed using the I and A unit. All the lens material was removed without any tears to the anterior or posterior capsule. Cohesive Viscoelastic was then injected to inflate the capsular bag. A foldable lens was then injected into the capsular bag. The lens was observed to be securely placed into the bag. The I and A was then used to remove the remaining viscoelastic. A 10-0 Biosorb  incisional suture was not] placed. BSS through an A/C cannula was used to perform stromal hydration in the wound. BSS was also used again to reform the chamber and bring the eye to physiologic IOP.  The wound was checked for leaks and none were found. Copious Vigomox drop and 1-2 drops of, Prednisolone Acetate 1% were placed topically prior to removing the lid specular and drapes.  The drapes were then removed. The patient will be sent to recovery and instructed to use Vigamox, Ketorolac, and Predinsolone Acetate 1% three times a day as well as follow all instructions on the postoperative sheet given to them and explained after surgery. The patient will return to see Dr. Thomas at their scheduled appointment within 24-36 hours after surgery.      Desmond Thomas M.D.              NIMA/donna           [date / time]

## 2022-04-30 NOTE — OP NOTE
Patient:   Claudia Melchor            MRN: [id number]            FIN: [id number]               Age:   80 years     Sex:  Female     :  1940   Associated Diagnoses:   None   Author:   Tk Sanders MD      Preoperative diagnosis: Cataract of the right  eye     Postoperative diagnosis: Same     Operative procedure: Cataract extraction with intraocular lens implant    Lens Style: [id number]    The patient was brought to the operating theater by wheelchair and under light sedation given topical anesthesia using 0.75% Marcaine.  After adequate anesthesia the patient was then prepped and draped in usual ophthalmological manner.   Ludmila lid speculums were applied and under the surgical microscope a keratome incision was made temporally using a mary keratome blade and a 15° mary keratome stab incision was made in the superior nasal quadrant.  Viscoat was instilled into the anterior chamber and an anterior capsulorrhexis was performed using a bent 25-gauge needle and the anterior capsule flap withdrawn with Kelman forceps. Using an irrigating Kelman cystotome the nucleus was irrigated and rocked free from the cortical bed and the handpiece was withdrawn. The phacoemulsification handpiece was introduced into the eye and phacoemulsification carried out the posterior chamber and the iris plane while a nucleus manipulator was used to direct the nucleus fragments  towards the phacoemulsification tip and prevent corneal contact. After phacoemulsification of the nucleus was completed the handpiece was withdrawn and an irrigation-aspiration handpiece was introduced into the eye and all visible cortical fragments were aspirated from the eye without difficulty. The handpiece was withdrawn and Healon was introduced into the eye to inflate the anterior chamber and the capsular bag.  An intraocular lens implant was selected, inspected, folded and placed into the lens injector and then the lens carefully  injected into the capsular bag while the haptics were positioned using the nucleus manipulator. The Healon was then aspirated from the eye using an irrigation-aspiration handpiece and the handpiece withdrawn from the eye. The anterior chamber was inflated with balanced salt solution and the wound checked for water tightness and found to be intact.  The antibiotic drops used preoperatively were instilled into the eye and the patient sent to the outpatient recovery in good condition.

## 2022-05-03 NOTE — HISTORICAL [facility]ER
This is a historical note converted from Debra. Formatting and pictures may have been removed.  Please reference Debra for original formatting and attached multimedia. Chief Complaint  Right shoulder pain x 1 month. Pt states RTC sx about 8 years ago. Pt states increased pain when moving and lifting arm. Pain level 7 out of 10...NG  History of Present Illness  Pain getting WORST for 2 months?? Pain right Shoulder laterally ?? No fever ?? No chills  ?? Pain in the right shoulder in the subacromial region ?? In the supraspinatus region ?? When actively and passively moved ?? ?? Started gradually ?? Slowly worsens with extended activity ?? Worsens at night ?? Causing an inability to sleep ?? Causes difficulty lying on affected side ?? Feels better after rest ?? Not relieved by medication ?? Shoulder joint stiffness on the right ?? joint stiffness ???? No radicular arm pain ?? No right sternoclavicular joint pain ?? No right shoulder joint swelling ?? No pain in the acromioclavicular  ? ?? No tingling of the right shoulder ?? No right shoulder numbness  ?? Range of motion was abnormal difficulty reaching over head using right arm ?? Range of motion was abnormal difficulty combing hair using right arm ?? Range of motion was abnormal difficulty taking shirt on/off using right arm  ?? No neck pain on right Percervical pain ?? Not in the trapezius Trapezius pain  Pain is significantly effecting quality of life  Review of Systems  Review of Systems?  ?????Constitutional: ?No fever, No chills. ?  ?????Respiratory: ?No shortness of breath, No cough. ?  ?????Cardiovascular: ?No chest pain. ?  ?????Gastrointestinal: ?No nausea, No vomiting, No diarrhea, No constipation, No heartburn. ?  ?????Genitourinary: ?No dysuria, No hematuria. ?  ?????Hematology/Lymphatics: ?No bleeding tendency. ?  ?????Endocrine: ?No polyuria. ?  ?????Neurologic: ?Alert and oriented X4, No numbness, No tingling. ?  ?????Psychiatric: ?No anxiety, No  depression. ?  ?????Integumentary: no abnormalities except as noted in history of present illness  Physical Exam  Vitals & Measurements  HT:?162.00?cm? WT:?93.000?kg? BMI:?35.44?  ?  Shoulder:  ?   ??NO atrophy of the deltoid muscle  ? atrophy of the supraspinatus muscle  ? atrophy of the infraspinatus muscle  ?  ???  Right Shoulder:??. ? Acromial tenderness on palpation. ? Tenderness on palpation of the subacromial bursa. ? Tenderness on palpation of the deltoid muscle. ? Tenderness on palpation of the supraspinatus muscle. ? Tenderness on palpation of the infraspinatus muscle. ? Tenderness on palpation of the glenohumeral joint region. ? Tenderness on palpation at the bicipital groove. ? Tenderness on palpation of the trapezius muscle. ? Subacromial crepitus on motion was noted.  ?  Right Shoulder:  ???  Shoulder Motion:??????????????Value?????????Normal Range  ???  Active abduction????????????????80 degrees  Active forward flexion????????????????160 degrees  Active external rotation???????????????40 degrees  Active internal rotation???????????????30 degrees  ???  ??NO swelling medial sternoclavicular.  ??NO swelling.  ??NO induration.  ??NO erythema.  ??NO long head biceps deformity.  ??NO winged scapula.  ??Motion was sbnormal.  ??pain was elicited during a Neer impingement test.  ??pain was elicited during a Arredondo-Karthik impingement test.  ??????  ?   ???????????????????????????????????????????????????????????????????????????????  Clavicle:  ???  General/bilateral:???Acromioclavicular joint showed NO pain elicited by motion distal right.  ???  Right Clavicle:?? Right sternoclavicular joint showed tenderness on palpation. ? Right acromioclavicular joint showed tenderness on palpation.  ???  Left Clavicle:?? Left sternoclavicular joint showed tenderness on palpation. ? Left acromioclavicular joint showed tenderness on palpation.  ???  ???  Neurological:  ???  ? NO abnormalities were noted Sensibility intact  distally on right.  ??Oriented to time, place, and person.  ???  Sensation:  ??NO sensory exam abnormalities were noted Decreased median sensation.  ??NO sensory exam abnormalities were noted Decreased ulnar sensation.  ??NO sensory exam abnormalities were noted Decreased radial sensation.  ???  Motor (Strength):  ?? weakness of the right shoulder was observed.  ?   ???  Injury / Incision Site:??? scar.  ???  TESTS  ???  Imaging:  ???  X-Ray Shoulder:?Complete, two or more views of the true AP of the glenohumeral joint were performed??????????????????????????????????????????????????????????????????????????????????  NO radiographic evidence of any osteoarticular abnormality???  mild arthritic changes?????????????????????????????????????????????????????????????????????????????????????????????????????????????????????????????????????????????????????????  ?   ???  ASSESSMENT  ?   ? Partial tear of rotator cuff tendon  ?   PLAN  right shoulder injection  Administered corticosteroid injection? 2cc cortisone and 2cc lidocaine using sterile technique after informed verbal consent. Risks discussed with patient prior to injection. Informed the patient of the following:  -?Explained to patient that this injection in some patients will experience increased pain a few minutes after the injection and that the pain will last anywhere from 10 to 20 minutes. In order to decrease the pain you need to do the following:  ?Make sure to move the extremity in which the injection was given. If you do not move the medication sits there and can cause more pain.  Ice the affected joint every 2 hours for 20 minutes at a time for the next 24 hours.  ?If you are not already taking an anti-inflammatory take the following Ibuprofen/ Advil take 3 to 4 tablets every 6 to 8 hours or 2 Aleve every 12 hours for the next 5 days to help the medication work. If you cannot take anti-inflammatory take 2 extra strength Tylenol every 6 hours for the next 5  days.  Patient voiced understanding ?????????????????????????????????????????????????????????????????????????????  ? Physical therapy service  ? Home exercises  ?  Assessment/Plan  1.?Incomplete rupture of right rotator cuff?M75.111  Ordered:  betamethasone, 12 mg, Intra-Articular, Once, first dose 12/08/21 10:00:00 CST, stop date 12/08/21 10:00:00 CST  Lidocaine inj., 2 mL, Intra-Articular, Once, first dose 12/08/21 9:13:00 CST, stop date 12/08/21 9:13:00 CST  asp/inj jnt/bursa, major [id number] PC, 12/08/21 9:13:00 CST, [facility], Routine, 12/08/21 9:13:00 CST, Incomplete rupture of right rotator cuff  Clinic Follow up, *Est. 01/08/22 3:00:00 CST, Order for future visit, Incomplete rupture of right rotator cuff, [facility]  Office/Outpatient Visit [facility] [id number] PC, Incomplete rupture of right rotator cuff, [facility], 12/08/21 9:13:00 CST  ?  Orders:  XR Shoulder Right Minimum 2 Views, Routine, 12/08/21 8:22:00 CST, None, Stretcher, Patient Has IV?, Rad Type, Right shoulder pain, Not Scheduled, 12/08/21 8:22:00 CST  Referrals  Clinic Follow up, *Est. 01/08/22 3:00:00 CST, Order for future visit, Incomplete rupture of right rotator cuff, [facility]   Problem List/Past Medical History  Ongoing  Carotid artery stenosis  Degenerative Joint Disease(  Confirmed  )  Dysuria  Fibromyalgia  Frequent falls  Hypercalcemia  Hypertension, essential(  Confirmed  )  Incomplete rupture of right rotator cuff  Numbness of legs  OAB (overactive bladder)(  Confirmed  )  Obesity  Palpitations  Presence of left artificial knee joint  Primary osteoarthritis of first carpometacarpal joint of left hand  Stress fracture, pelvis, initial encounter for fracture  Well adult  Historical  Able to lie down  Bulging lumbar disc  Chronic back pain  CL - Contact lens  Exercise tolerance  Meniscus tear  Obesity  Reflux  UI (urinary incontinence)  Procedure/Surgical History  92758 - RT CATARACT W/IOL 1 STA PHACO  (Right) (03/15/2021)  Extracapsular cataract removal with insertion of intraocular lens prosthesis (1 stage procedure), manual or mechanical technique (eg, irrigation and aspiration or phacoemulsification); without endoscopic cyclophotocoagulation (03/15/2021)  Replacement of Right Lens with Synthetic Substitute, Percutaneous Approach (03/15/2021)  Colonoscopy (06/19/2019)  Arthroscopy Knee (Right) (01/07/2016)  Arthroscopy, knee, surgical; with meniscectomy (medial AND lateral, including any meniscal shaving) including debridement/shaving of articular cartilage (chondroplasty), same or separate compartment(s), when performed (01/07/2016)  Excision of Right Knee Joint, Percutaneous Endoscopic Approach (01/07/2016)  Excision of Right Knee Joint, Percutaneous Endoscopic Approach (01/07/2016)  Extirpation of Matter from Right Knee Joint, Percutaneous Endoscopic Approach (01/07/2016)  Repair Right Knee Joint, Percutaneous Endoscopic Approach (01/07/2016)  Repair of ruptured musculotendinous cuff (eg, rotator cuff) open; acute. (09/11/2014)  Rotator cuff (09/11/2014)  Rotator cuff repair (09/11/2014)  Rotator Cuff Repair (Left) (09/11/2014)  Acromioplasty or acromionectomy, partial, with or without coracoacromial ligament release. (06/05/2014)  Injection of anesthetic into peripheral nerve for analgesia (06/05/2014)  Injection, anesthetic agent; brachial plexus, single. (06/05/2014)  Other repair of shoulder (06/05/2014)  Repair of ruptured musculotendinous cuff (eg, rotator cuff) open; chronic. (06/05/2014)  Rotator cuff repair (06/05/2014)  Rotator Cuff Repair (Left) (06/05/2014)  Acromioplasty or acromionectomy, partial, with or without coracoacromial ligament release. (05/20/2013)  Injection of anesthetic into peripheral nerve for analgesia (05/20/2013)  Injection, anesthetic agent; brachial plexus, single. (05/20/2013)  Other plastic operations on tendon (05/20/2013)  Other repair of shoulder (05/20/2013)  Repair of  ruptured musculotendinous cuff (eg, rotator cuff) open; chronic. (05/20/2013)  Rotator cuff repair (05/20/2013)  Tenodesis of long tendon of biceps. (05/20/2013)  Lt total knee replacement (2009)  Rt Shoulder surgery (2005)  Hysterectomy (1977)  Tonsillectomy and adenoidectomy (1945)  Appendectomy (1940)  back surgery  gastric bypass  knee surgery x3  left and right meniscus repair  neck surgery  Rt knee surgery  Salpingectomy   Medications  aspirin 81 mg oral tablet, CHEWABLE, 81 mg= 1 tab(s), Oral, Daily  Atropine 1% Ophthal Soln 2ml, 1 drop(s), OPTH, Once  Cardizem  mg/24 hours oral CAPsule, extended release, 120 mg= 1 cap(s), Oral, Daily, 11 refills  Celestone, 12 mg, Intra-Articular, Once  Claritin 10 mg oral tablet, 10 mg= 1 tab(s), Oral, Daily, 11 refills  clotrimazole 1% vaginal cream with applicator, See Instructions  FeroSul 325 mg (65 mg elemental iron) oral tablet, 325 mg= 1 tab(s), Oral, Daily  furosemide 20 mg oral tablet, See Instructions  lidocaine 2% injectable solution, 2 mL, Intra-Articular, Once  Mapap 500 mg oral tablet, 500 mg= 1 tab(s), Oral, q6hr, PRN  metaxalone 800 mg oral tablet, 800 mg= 1 tab(s), Oral, Daily  MVI with Minerals (Adult Tab), 1 tab(s), Oral, Daily  Allergies  Keflex?(Rash)  Levaquin?(Refused)  Neurontin?(does not recall reaction)  Penicillin?(shock, extreme drop in BP)  Percocet 10/325  Soy?(Itching.....)  Tramadol?(itching)  codeine?(N/V)  sulfamethoxazole?(rash)  Social History  Abuse/Neglect  No, No, 12/08/2021  Alcohol - Denies Alcohol Use, 05/15/2013  Never, 04/14/2015  Employment/School  Retired, Work/School description: Nurse. Highest education level: High school., 05/15/2013  Exercise  Exercise frequency: 1-2 times/week. Exercise type: Swimming., 04/14/2015  Home/Environment  Lives with Spouse., 05/15/2013  Nutrition/Health  Regular, 05/15/2013  Sexual  Sexually active: No., 01/06/2017  Substance Use - Denies Substance Abuse, 05/15/2013  Never,  04/14/2015  Tobacco  Former smoker, quit more than 30 days ago, N/A, 12/08/2021  Family History  Heart failure.: Father.  Primary malignant neoplasm of prostate: Brother.  Stroke: Brother.  Immunizations  Vaccine Date Status Comments   influenza virus vaccine, inactivated 09/21/2021 Given    COVID-19 MRNA, LNP-S, PF- Pfizer 02/04/2021 Recorded    COVID-19 MRNA, LNP-S, PF- Pfizer 01/14/2021 Recorded    influenza virus vaccine, inactivated 09/22/2020 Recorded    influenza virus vaccine, inactivated 10/22/2019 Recorded    influenza virus vaccine, inactivated 10/12/2018 Given pt tolerated injection well   influenza virus vaccine, inactivated 10/17/2017 Given    pneumococcal 13-valent conjugate vaccine 10/25/2016 Given    influenza virus vaccine, inactivated 10/25/2016 Given    influenza virus vaccine, inactivated 10/27/2015 Given    influenza virus vaccine, inactivated 10/28/2014 Recorded    pneumococcal 23-polyvalent vaccine 10/28/2014 Recorded    influenza virus vaccine, inactivated 10/29/2013 Recorded    zoster vaccine live 09/14/2012 Recorded    influenza virus vaccine, inactivated 09/14/2012 Recorded    zoster vaccine live 10/31/2006 Recorded [1/6/2017] unsure of date, but she did receive vaccine   Health Maintenance  Health Maintenance  ???Pending?(in the next year)  ??? ??OverDue  ??? ? ? ?Hypertension Management-Education due??06/22/21??and every 1??year(s)  ??? ? ? ?ADL Screening due??06/22/21??and every 1??year(s)  ??? ? ? ?Medicare Annual Wellness Exam due??11/02/21??and every 1??year(s)  ??? ??Due?  ??? ? ? ?Coronary Artery Disease Maintenance-Lipid Lowering Therapy due??12/08/21??Unknown Frequency  ??? ? ? ?Tetanus Vaccine due??12/08/21??and every 10??year(s)  ??? ? ? ?Zoster Vaccine due??12/08/21??Unknown Frequency  ??? ??Refused?  ??? ? ? ?Bone Density Screening due??12/08/21??Variable frequency  ??? ??Due In Future?  ??? ? ? ?Obesity Screening not due until??01/01/22??and every 1??year(s)  ??? ? ?  ?Advance Directive not due until??01/02/22??and every 1??year(s)  ??? ? ? ?Cognitive Screening not due until??01/02/22??and every 1??year(s)  ??? ? ? ?Fall Risk Assessment not due until??01/02/22??and every 1??year(s)  ??? ? ? ?Functional Assessment not due until??01/02/22??and every 1??year(s)  ??? ? ? ?Blood Pressure Screening not due until??09/21/22??and every 1??year(s)  ??? ? ? ?Body Mass Index Check not due until??09/21/22??and every 1??year(s)  ??? ? ? ?Depression Screening not due until??09/21/22??and every 1??year(s)  ??? ? ? ?Hypertension Management-Blood Pressure not due until??09/21/22??and every 1??year(s)  ??? ? ? ?Diabetes Screening not due until??12/06/22??and every 1??year(s)  ??? ? ? ?Hypertension Management-BMP not due until??12/06/22??and every 1??year(s)  ???Satisfied?(in the past 1 year)  ??? ??Satisfied?  ??? ? ? ?Advance Directive on??09/21/21.??Satisfied by Josephine Felipe LPN  ??? ? ? ?Blood Pressure Screening on??09/21/21.??Satisfied by Josephine Felipe LPN  ??? ? ? ?Body Mass Index Check on??12/08/21.??Satisfied by Lucho Miller  ??? ? ? ?Breast Cancer Screening on??10/20/21.??Satisfied by Jonathan Villasenor  ??? ? ? ?Cognitive Screening on??03/24/21.??Satisfied by Lorena Benjamin  ??? ? ? ?Depression Screening on??09/21/21.??Satisfied by Josephine Felipe LPN  ??? ? ? ?Diabetes Screening on??12/06/21.??Satisfied by Vonnie Riggins  ??? ? ? ?Fall Risk Assessment on??12/08/21.??Satisfied by Lucho Miller  ??? ? ? ?Functional Assessment on??09/21/21.??Satisfied by Josephine Felipe LPN  ??? ? ? ?Hypertension Management-BMP on??12/06/21.??Satisfied by Vonnie Riggins  ??? ? ? ?Influenza Vaccine on??09/21/21.??Satisfied by Carlos Kidd MD  ??? ? ? ?Lipid Screening on??09/13/21.??Satisfied by Josephine Felipe LPN  ??? ? ? ?Obesity Screening on??12/08/21.??Satisfied by Lucho Miller  ??? ??Refused?  ??? ? ? ?Bone Density Screening on??04/28/21.??Recorded by Demetrice Scott  ?

## 2022-05-03 NOTE — HISTORICAL [facility] CERNER
This is a historical note converted from Debra. Formatting and pictures may have been removed.  Please reference Debra for original formatting and attached multimedia. Chief Complaint  right hip pain x 1 month-no previous treatment  History of Present Illness  This 79-year-old?comes in for right hip pain. ?She states that her pain began about 2 weeks ago and it was quite severe.? It is somewhat better today.? She states that she has had numerous falls but she does not?remember a single fall that could have caused this problem. ?However she walks on a walker at all time.? She points to her groin and her trochanteric bursa area as a source of pain.? She is accompanied by her .  Review of Systems  Constitutional: No fever, weakness, or fatigue.  Ear/Nose/Mouth/Throat: No nasal congestion or sore throat.  Respiratory: No shortness of breath or cough.  Cardiovascular: No chest pain, palpitations, or peripheral edema.  Gastrointestinal: No nausea, vomiting, or abdominal pain.  Genitourinary: No dysuria.  Musculoskeletal: See current complaints  Integumentary: Negative.  Physical Exam  Vitals & Measurements  BP:?110/84?  HT:?162.5?cm? WT:?92.5?kg? BMI:?35.03?  Physical examination shows that she does have mild trochanteric bursitis on the right. ?However she has pain?in her hip joint itself with range of motion.? Flexion is 90 degrees, extension is 0 degrees,?internal rotation is 5 degrees and external rotation is 15 degrees.? She appears to be grossly motor and sensory intact. ?She has no evidence of sacroiliitis or piriformis syndrome.  ?  AP pelvis with AP and lateral of the right hip shows that the patient has narrowing of the right hip joint?with DHARA grade 3 changes.? The patient also may have a stress fracture of the acetabulum on one view.  Assessment/Plan  1.?Right hip pain?M25.551  ? The findings were reviewed with the patient and she expressed understanding. ?The patient will be sent for a CT scan to  rule out stress fracture of the pelvis. ?I will see her back in 1 week for recheck. ?She is to continue to ambulate on her rolling walker.? She requested no pain medication. ?She is instructed to call if she has any problems prior to her next appointment.  Ordered:  Clinic Follow up, *Est. 06/19/19 3:00:00 CDT, Order for future visit, Right hip pain  Stress fracture, pelvis, initial encounter for fracture, Orthopaedics  Office/Outpatient Visit Level 3 Established [id number] PC, Right hip pain  Stress fracture, pelvis, [facility], 06/12/19 10:18:00 CDT  XR Hip Right 2 Views, Routine, 06/12/19 9:53:00 CDT, Pain, None, Ambulatory, Patient Has IV?, Rad Type, Right hip pain, Not Scheduled, 06/12/19 9:53:00 CDT  ?  2.?Stress fracture, pelvis, initial encounter for fracture?M84.350A  Ordered:  Clinic Follow up, *Est. 06/19/19 3:00:00 CDT, Order for future visit, Right hip pain  Stress fracture, pelvis, initial encounter for fracture, Orthopaedics  Office/Outpatient Visit Level 3 Established [id number] PC, Right hip pain  Stress fracture, pelvis, initial encounter for fracture, [facility], 06/12/19 10:18:00 CDT  ?  Referrals  Clinic Follow up, *Est. 06/19/19 3:00:00 CDT, Order for future visit, Right hip pain  Stress fracture, pelvis[facility]   Problem List/Past Medical History  Ongoing  Annual wellness visit  Carotid artery stenosis  Cough  Degenerative Joint Disease(  Confirmed  )  DJD (degenerative joint disease)  Fibromyalgia  Frequent falls  Hypertension, essential(  Confirmed  )  OAB (overactive bladder)(  Confirmed  )  Obesity  Ongoing use of possibly toxic medication  Right hip pain  Stress fracture, pelvis, initial encounter for fracture  Well adult  Historical  Able to lie down  Bulging lumbar disc  Chronic back pain  CL - Contact lens  Exercise tolerance  Meniscus tear  Obesity  Reflux  UI (urinary  incontinence)  Procedure/Surgical History  Arthroscopy Knee (Right) (01/07/2016)  Arthroscopy, knee, surgical; with meniscectomy (medial AND lateral, including any meniscal shaving) including debridement/shaving of articular cartilage (chondroplasty), same or separate compartment(s), when performed (01/07/2016)  Excision of Right Knee Joint, Percutaneous Endoscopic Approach (01/07/2016)  Excision of Right Knee Joint, Percutaneous Endoscopic Approach (01/07/2016)  Extirpation of Matter from Right Knee Joint, Percutaneous Endoscopic Approach (01/07/2016)  Repair Right Knee Joint, Percutaneous Endoscopic Approach (01/07/2016)  Repair of ruptured musculotendinous cuff (eg, rotator cuff) open; acute. (09/11/2014)  Rotator cuff (09/11/2014)  Rotator cuff repair (09/11/2014)  Rotator Cuff Repair (Left) (09/11/2014)  Acromioplasty or acromionectomy, partial, with or without coracoacromial ligament release. (06/05/2014)  Injection of anesthetic into peripheral nerve for analgesia (06/05/2014)  Injection, anesthetic agent; brachial plexus, single. (06/05/2014)  Other repair of shoulder (06/05/2014)  Repair of ruptured musculotendinous cuff (eg, rotator cuff) open; chronic. (06/05/2014)  Rotator cuff repair (06/05/2014)  Rotator Cuff Repair (Left) (06/05/2014)  Acromioplasty or acromionectomy, partial, with or without coracoacromial ligament release. (05/20/2013)  Injection of anesthetic into peripheral nerve for analgesia (05/20/2013)  Injection, anesthetic agent; brachial plexus, single. (05/20/2013)  Other plastic operations on tendon (05/20/2013)  Other repair of shoulder (05/20/2013)  Repair of ruptured musculotendinous cuff (eg, rotator cuff) open; chronic. (05/20/2013)  Rotator cuff repair (05/20/2013)  Tenodesis of long tendon of biceps. (05/20/2013)  Lt total knee replacement (2009)  Rt Shoulder surgery (2005)  Hysterectomy (1977)  Tonsillectomy and adenoidectomy (1945)  Appendectomy (1940)  back surgery  gastric  bypass  knee surgery x3  left and right meniscus repair  neck surgery  Rt knee surgery  Salpingectomy   Medications  aspirin 81 mg oral tablet, 81 mg= 1 tab(s), Oral, Daily  Benadryl 25 mg oral tablet, 25 mg= 1 tab(s), Oral, Once a day (at bedtime), 11 refills  Claritin 10 mg oral tablet, 10 mg= 1 tab(s), Oral, Daily, 11 refills  hydrochlorothiazide 25 mg oral tablet, 50 mg= 2 tab(s), Oral, Daily, 11 refills  hydrochlorothiazide 50 mg oral tablet, 50 mg= 1 tab(s), Oral, Daily, 3 refills  MVI with Minerals (Adult Tab), 1 tab(s), Oral, Daily  Skelaxin 800 mg oral tablet, Oral  Toprol-XL 25 mg oral tablet, extended release, 25 mg= 1 tab(s), Oral, Daily, 11 refills  Trumeric, See Instructions  Tums, 1 tab(s), Oral, BID  Tylenol Extra Strength 500 mg oral tablet, 1000 mg= 2 tab(s), Oral, q4hr, PRN  Vitamin D, 1 tab(s), Oral, Daily  Allergies  Keflex?(Rash)  Levaquin?(Refused)  Neurontin?(does not recall reaction)  Penicillin?(shock, extreme drop in BP)  Percocet 10/325  Soy?(Itching 07-JUN-2017 17:52:07<$>)  Tramadol?(itching)  codeine?(N/V)  sulfamethoxazole?(rash)  Social History  Abuse/Neglect  No, 06/12/2019  Alcohol - Denies Alcohol Use, 05/15/2013  Never, 04/14/2015  Employment/School  Retired, Work/School description: Nurse. Highest education level: High school., 05/15/2013  Exercise  Exercise frequency: 1-2 times/week. Exercise type: Swimming., 04/14/2015  Home/Environment  Lives with Spouse., 05/15/2013  Nutrition/Health  Regular, 05/15/2013  Sexual  Sexually active: No., 01/06/2017  Substance Use - Denies Substance Abuse, 05/15/2013  Never, 04/14/2015  Tobacco  Former smoker, quit more than 30 days ago, N/A, 06/12/2019  Family History  Heart failure.: Father.  Primary malignant neoplasm of prostate: Brother.  Stroke: Brother.  Immunizations  Vaccine Date Status Comments   influenza virus vaccine, inactivated 10/12/2018 Given pt tolerated injection well   influenza virus vaccine, inactivated 10/17/2017 Given     pneumococcal 13-valent conjugate vaccine 10/25/2016 Given    influenza virus vaccine, inactivated 10/25/2016 Given    influenza virus vaccine, inactivated 10/27/2015 Given    influenza virus vaccine, inactivated 10/28/2014 Recorded    pneumococcal 23-polyvalent vaccine 10/28/2014 Recorded    influenza virus vaccine, inactivated 10/29/2013 Recorded    zoster vaccine live 10/31/2006 Recorded [1/6/2017] unsure of date, but she did receive vaccine   Health Maintenance  Health Maintenance  ???Pending?(in the next year)  ??? ??OverDue  ??? ? ? ?Coronary Artery Disease Maintenance-Antiplatelet Agent Prescribed due??and every?  ??? ? ? ?Pneumococcal Vaccine due??and every?  ??? ? ? ?Bone Density Screening due??06/27/18??Variable frequency  ??? ? ? ?Advance Directive due??01/01/19??and every 1??year(s)  ??? ? ? ?Cognitive Screening due??01/01/19??and every 1??year(s)  ??? ? ? ?Fall Risk Assessment due??01/01/19??and every 1??year(s)  ??? ? ? ?Functional Assessment due??01/01/19??and every 1??year(s)  ??? ? ? ?Geriatric Depression Screening due??01/01/19??and every 1??year(s)  ??? ? ? ?Hypertension Management-Education due??02/27/19??and every 1??year(s)  ??? ??Due?  ??? ? ? ?ADL Screening due??06/12/19??and every 1??year(s)  ??? ? ? ?Coronary Artery Disease Maintenance-Lipid Lowering Therapy due??06/12/19??and every?  ??? ? ? ?Tetanus Vaccine due??06/12/19??and every 10??year(s)  ??? ??Due In Future?  ??? ? ? ?Hypertension Management-BMP not due until??08/10/19??and every 1??year(s)  ??? ? ? ?Hypertension Management-Blood Pressure not due until??08/14/19??and every 1??year(s)  ??? ? ? ?Aspirin Therapy for CVD Prevention not due until??08/14/19??and every 1??year(s)  ??? ? ? ?Obesity Screening not due until??01/01/20??and every 1??year(s)  ???Satisfied?(in the past 1 year)  ??? ??Satisfied?  ??? ? ? ?Advance Directive on??08/14/18.??Satisfied by Josephine Mathews LPN  ??? ? ? ?Aspirin Therapy for CVD Prevention  on??08/14/18.??Satisfied by Josephine Mathews LPN  ??? ? ? ?Blood Pressure Screening on??06/12/19.??Satisfied by Aleyda Perez  ??? ? ? ?Body Mass Index Check on??06/12/19.??Satisfied by Aleyda Perez  ??? ? ? ?Breast Cancer Screening on??07/25/18.??Satisfied by Kenney ?Livia DURANT  ??? ? ? ?Diabetes Screening on??08/10/18.??Satisfied by Colt Carballo  ??? ? ? ?Fall Risk Assessment on??08/14/18.??Satisfied by Josephine Mathews LPN  ??? ? ? ?Functional Assessment on??08/14/18.??Satisfied by Josephine Mathews LPN  ??? ? ? ?Hypertension Management-Blood Pressure on??06/12/19.??Satisfied by Aleyda Perez  ??? ? ? ?Influenza Vaccine on??10/12/18.??Satisfied by Beronica Culp LPN  ??? ? ? ?Lipid Screening on??08/10/18.??Satisfied by Noni Carballo MT  ??? ? ? ?Obesity Screening on??06/12/19.??Satisfied by Aleyda Perez  ?  ?

## 2022-05-03 NOTE — HISTORICAL [facility] CERNER
This is a historical note converted from Cerner. Formatting and pictures may have been removed.  Please reference [employer] for original formatting and attached multimedia. Chief Complaint  Pt c/o left wrist pain, pt reports falling 6 weeks ago and pain is not getting any better  History of Present Illness  This 80-year-old comes in for 2 problems. ?The patient had a pretty significant fall at home.? She is complaining of left thumb pain and left knee pain.? The patient is status post a left total knee arthroplasty.? The patient states that her knee is stable but she feels globally weak.? She uses a rolling walker at all times. ?She is accompanied by her .  Review of Systems  Constitutional: No fever, weakness, or fatigue.? Global deconditioning  Ear/Nose/Mouth/Throat: No nasal congestion or sore throat.  Respiratory: No shortness of breath or cough.  Cardiovascular: No chest pain, palpitations, or peripheral edema.  Gastrointestinal: No nausea, vomiting, or abdominal pain.  Genitourinary: No dysuria.  Musculoskeletal: See current complaints; multiple joint complaints  Integumentary: Negative.  Physical Exam  Vitals & Measurements  T:?37.0? ?C (Oral)? HR:?86(Peripheral)? BP:?122/72?  HT:?162?cm? WT:?94.5?kg? BMI:?36.01?  Physical examination shows that the patient has significant CMC arthritis of the left thumb.? The patient has no significant pain around her distal radial ulnar joint. ?She has no pain at her radiocarpal joint.? She has no evidence of de Quervains tenosynovitis.? She is grossly motor and sensory intact. ?There is no instability noted.? She has no nerve entrapment.  ?  Semination of the left knee shows that she has?a trace effusion in the left knee. ?Ecchymosis has resolved. ?Range of motion is?5-115 degrees with good patella tracking and no evidence of ligamentous laxity.? There is tenderness around a previous medial?arthroscopy portal but there is no tenderness around her prosthetic  components.? She appears to be motor and sensory intact.  ?  3 views of the left?hand and wrist shows that the patient has significant CMC arthritis of the left thumb with cystic changes throughout her carpus and her radiocarpal joint.? There is no evidence of fracture.  ?  Standing AP, lateral, and sunrise view of the left knee shows that her prosthesis is in good position?with no evidence of loosening.? Her patella, which was not resurfaced is well centered in the notch.  Assessment/Plan  1.?Contusion of left knee?S80.02XA  ?The findings were reviewed with the patient and she expressed understanding. ?The patient will continue with symptomatic treatment.? She has a thumb brace at home.? I will see her back as needed. ?She is instructed to call if she has any problems.? She was counseled on how much Tylenol is safe for her to take on a daily basis.  Ordered:  Office/Outpatient Visit Level 3 Established [id number] PC, Contusion of left knee  Presence of left artificial knee joint  Primary osteoarthritis of first carpometacarpal joint of left hand, [facility], 05/12/20 14:09:00 CDT  ?  2.?Presence of left artificial knee joint?Z96.652  Ordered:  Office/Outpatient Visit Level 3 Established [id number] PC, Contusion of left knee  Presence of left artificial knee joint  Primary osteoarthritis of first carpometacarpal joint of left hand, [facility], 05/12/20 14:09:00 CDT  ?  3.?Primary osteoarthritis of first carpometacarpal joint of left hand?M18.12  Ordered:  Office/Outpatient Visit Level 3 Established [id number] PC, Contusion of left knee  Presence of left artificial knee joint  Primary osteoarthritis of first carpometacarpal joint of left hand, [facility], 05/12/20 14:09:00 CDT  ?  Orders:  Clinic Follow-up PRN, 05/12/20 14:09:00 CDT, Future Order, [facility]  XR Knee Left 3 Views, Routine, 05/12/20 13:26:00 CDT, Pain, None, Ambulatory, Patient Has IV?, Rad Type, Left knee pain, 05/12/20  13:26:00 CDT  Referrals  Clinic Follow-up PRN, 05/12/20 14:09:00 CDT, Future Order, [facility]   Problem List/Past Medical History  Ongoing  Annual wellness visit  Carotid artery stenosis  Contusion of left knee  Cough  Degenerative Joint Disease(  Confirmed  )  DJD (degenerative joint disease)  Epistaxis  Fibromyalgia  Frequent falls  Hypertension, essential(  Confirmed  )  Numbness of legs  OAB (overactive bladder)(  Confirmed  )  Obesity  Ongoing use of possibly toxic medication  Presence of left artificial knee joint  Primary osteoarthritis of first carpometacarpal joint of left hand  Right hip pain  Stress fracture, pelvis, initial encounter for fracture  Well adult  Historical  Able to lie down  Bulging lumbar disc  Chronic back pain  CL - Contact lens  Exercise tolerance  Meniscus tear  Obesity  Reflux  UI (urinary incontinence)  Procedure/Surgical History  Colonoscopy (06/19/2019)  Arthroscopy Knee (Right) (01/07/2016)  Arthroscopy, knee, surgical; with meniscectomy (medial AND lateral, including any meniscal shaving) including debridement/shaving of articular cartilage (chondroplasty), same or separate compartment(s), when performed (01/07/2016)  Excision of Right Knee Joint, Percutaneous Endoscopic Approach (01/07/2016)  Excision of Right Knee Joint, Percutaneous Endoscopic Approach (01/07/2016)  Extirpation of Matter from Right Knee Joint, Percutaneous Endoscopic Approach (01/07/2016)  Repair Right Knee Joint, Percutaneous Endoscopic Approach (01/07/2016)  Repair of ruptured musculotendinous cuff (eg, rotator cuff) open; acute. (09/11/2014)  Rotator cuff (09/11/2014)  Rotator cuff repair (09/11/2014)  Rotator Cuff Repair (Left) (09/11/2014)  Acromioplasty or acromionectomy, partial, with or without coracoacromial ligament release. (06/05/2014)  Injection of anesthetic into peripheral nerve for analgesia (06/05/2014)  Injection, anesthetic agent; brachial plexus, single. (06/05/2014)  Other  repair of shoulder (06/05/2014)  Repair of ruptured musculotendinous cuff (eg, rotator cuff) open; chronic. (06/05/2014)  Rotator cuff repair (06/05/2014)  Rotator Cuff Repair (Left) (06/05/2014)  Acromioplasty or acromionectomy, partial, with or without coracoacromial ligament release. (05/20/2013)  Injection of anesthetic into peripheral nerve for analgesia (05/20/2013)  Injection, anesthetic agent; brachial plexus, single. (05/20/2013)  Other plastic operations on tendon (05/20/2013)  Other repair of shoulder (05/20/2013)  Repair of ruptured musculotendinous cuff (eg, rotator cuff) open; chronic. (05/20/2013)  Rotator cuff repair (05/20/2013)  Tenodesis of long tendon of biceps. (05/20/2013)  Lt total knee replacement (2009)  Rt Shoulder surgery (2005)  Hysterectomy (1977)  Tonsillectomy and adenoidectomy (1945)  Appendectomy (1940)  back surgery  gastric bypass  knee surgery x3  left and right meniscus repair  neck surgery  Rt knee surgery  Salpingectomy   Medications  aspirin 81 mg oral tablet, 81 mg= 1 tab(s), Oral, Daily  Claritin 10 mg oral tablet, 10 mg= 1 tab(s), Oral, Daily, 11 refills  clotrimazole 1% vaginal cream with applicator, See Instructions, 11 refills  hydrochlorothiazide 50 mg oral tablet, See Instructions, 2 refills  Mapap 500 mg oral tablet, 500 mg= 1 tab(s), Oral, Daily  meclizine 25 mg oral tablet, See Instructions  metaxalone 800 mg oral tablet, 800 mg= 1 tab(s), Oral, Daily  Metoprolol Succinate ER 25 mg oral tablet extended release, See Instructions, 11 refills  MVI with Minerals (Adult Tab), 1 tab(s), Oral, Daily  Vitamin D, 2000 IntUnit, Oral, Daily  Allergies  Keflex?(Rash)  Levaquin?(Refused)  Neurontin?(does not recall reaction)  Penicillin?(shock, extreme drop in BP)  Percocet 10/325  Soy?(Itching.....)  Tramadol?(itching)  codeine?(N/V)  sulfamethoxazole?(rash)  Social History  Abuse/Neglect  No, 05/12/2020  No, 10/29/2019  Alcohol - Denies Alcohol Use, 05/15/2013  Never,  04/14/2015  Employment/School  Retired, Work/School description: Nurse. Highest education level: High school., 05/15/2013  Exercise  Exercise frequency: 1-2 times/week. Exercise type: Swimming., 04/14/2015  Home/Environment  Lives with Spouse., 05/15/2013  Nutrition/Health  Regular, 05/15/2013  Sexual  Sexually active: No., 01/06/2017  Substance Use - Denies Substance Abuse, 05/15/2013  Never, 04/14/2015  Tobacco  Former smoker, quit more than 30 days ago, N/A, 05/12/2020  Family History  Heart failure.: Father.  Primary malignant neoplasm of prostate: Brother.  Stroke: Brother.  Immunizations  Vaccine Date Status Comments   influenza virus vaccine, inactivated 10/12/2018 Given pt tolerated injection well   influenza virus vaccine, inactivated 10/17/2017 Given    pneumococcal 13-valent conjugate vaccine 10/25/2016 Given    influenza virus vaccine, inactivated 10/25/2016 Given    influenza virus vaccine, inactivated 10/27/2015 Given    influenza virus vaccine, inactivated 10/28/2014 Recorded    pneumococcal 23-polyvalent vaccine 10/28/2014 Recorded    influenza virus vaccine, inactivated 10/29/2013 Recorded    zoster vaccine live 10/31/2006 Recorded [1/6/2017] unsure of date, but she did receive vaccine   Health Maintenance  Health Maintenance  ???Pending?(in the next year)  ??? ??OverDue  ??? ? ? ?Coronary Artery Disease Maintenance-Antiplatelet Agent Prescribed due??and every?  ??? ? ? ?Pneumococcal Vaccine due??and every?  ??? ? ? ?Medicare Annual Wellness Exam due??04/26/17??and every 1??year(s)  ??? ? ? ?Bone Density Screening due??06/27/18??Variable frequency  ??? ? ? ?Geriatric Depression Screening due??01/01/20??and every 1??year(s)  ??? ??Due?  ??? ? ? ?Coronary Artery Disease Maintenance-Lipid Lowering Therapy due??05/12/20??and every?  ??? ? ? ?Tetanus Vaccine due??05/12/20??and every 10??year(s)  ??? ??Due In Future?  ??? ? ? ?ADL Screening not due until??06/27/20??and every 1??year(s)  ??? ? ?  ?Hypertension Management-Education not due until??06/27/20??and every 1??year(s)  ??? ? ? ?Advance Directive not due until??01/01/21??and every 1??year(s)  ??? ? ? ?Cognitive Screening not due until??01/01/21??and every 1??year(s)  ??? ? ? ?Fall Risk Assessment not due until??01/01/21??and every 1??year(s)  ??? ? ? ?Functional Assessment not due until??01/01/21??and every 1??year(s)  ??? ? ? ?Obesity Screening not due until??01/01/21??and every 1??year(s)  ??? ? ? ?Hypertension Management-BMP not due until??01/23/21??and every 1??year(s)  ??? ? ? ?Hypertension Management-Blood Pressure not due until??01/27/21??and every 1??year(s)  ???Satisfied?(in the past 1 year)  ??? ??Satisfied?  ??? ? ? ?ADL Screening on??06/27/19.??Satisfied by Joce Zhong LPN.  ??? ? ? ?Advance Directive on??01/28/20.??Satisfied by Josephine Felipe LPN  ??? ? ? ?Blood Pressure Screening on??05/12/20.??Satisfied by Shaun Antoine  ??? ? ? ?Body Mass Index Check on??05/12/20.??Satisfied by Shaun Antoine  ??? ? ? ?Breast Cancer Screening on??07/30/19.??Satisfied by Stacie Sandoval  ??? ? ? ?Cognitive Screening on??06/27/19.??Satisfied by Joce Zhong LPN.  ??? ? ? ?Colorectal Screening on??06/19/19.??Satisfied by Josephine Felipe LPN  ??? ? ? ?Depression Screening on??06/27/19.??Satisfied by Joce Zhong LPN E.  ??? ? ? ?Diabetes Screening on??01/24/20.??Satisfied by Zohaib aWtson  ??? ? ? ?Fall Risk Assessment on??01/28/20.??Satisfied by Josephine Felipe LPN  ??? ? ? ?Functional Assessment on??01/28/20.??Satisfied by Josephine Felipe LPN  ??? ? ? ?Geriatric Depression Screening on??06/27/19.??Satisfied by Joce Zhong LPN.  ??? ? ? ?Hypertension Management-Blood Pressure on??05/12/20.??Satisfied by Shaun Antoine  ??? ? ? ?Lipid Screening on??01/24/20.??Satisfied by Zohaib Watson  ??? ? ? ?Obesity Screening on??05/12/20.??Satisfied by Shaun Antoine  ?

## 2022-07-01 ENCOUNTER — TELEPHONE (OUTPATIENT)
Dept: INTERNAL MEDICINE | Facility: CLINIC | Age: 82
End: 2022-07-01
Payer: MEDICARE

## 2022-07-04 PROCEDURE — G0179 PR HOME HEALTH MD RECERTIFICATION: ICD-10-PCS | Mod: ,,, | Performed by: INTERNAL MEDICINE

## 2022-07-04 PROCEDURE — G0179 MD RECERTIFICATION HHA PT: HCPCS | Mod: ,,, | Performed by: INTERNAL MEDICINE

## 2022-07-07 ENCOUNTER — PATIENT OUTREACH (OUTPATIENT)
Dept: HOME HEALTH SERVICES | Facility: HOSPITAL | Age: 82
End: 2022-07-07
Payer: MEDICARE

## 2022-07-08 ENCOUNTER — EXTERNAL HOME HEALTH (OUTPATIENT)
Dept: HOME HEALTH SERVICES | Facility: HOSPITAL | Age: 82
End: 2022-07-08
Payer: MEDICARE

## 2022-09-02 PROCEDURE — G0179 PR HOME HEALTH MD RECERTIFICATION: ICD-10-PCS | Mod: ,,, | Performed by: INTERNAL MEDICINE

## 2022-09-02 PROCEDURE — G0179 MD RECERTIFICATION HHA PT: HCPCS | Mod: ,,, | Performed by: INTERNAL MEDICINE

## 2022-09-12 ENCOUNTER — PATIENT OUTREACH (OUTPATIENT)
Dept: HOME HEALTH SERVICES | Facility: HOSPITAL | Age: 82
End: 2022-09-12
Payer: MEDICARE

## 2022-09-21 DIAGNOSIS — I10 HYPERTENSION, UNSPECIFIED TYPE: ICD-10-CM

## 2022-09-21 DIAGNOSIS — E83.52 HYPERCALCEMIA: ICD-10-CM

## 2022-09-21 DIAGNOSIS — R53.83 FATIGUE, UNSPECIFIED TYPE: ICD-10-CM

## 2022-09-21 DIAGNOSIS — M19.90 OSTEOARTHRITIS, UNSPECIFIED OSTEOARTHRITIS TYPE, UNSPECIFIED SITE: Primary | ICD-10-CM

## 2022-09-21 DIAGNOSIS — I65.29 STENOSIS OF CAROTID ARTERY, UNSPECIFIED LATERALITY: ICD-10-CM

## 2022-09-27 NOTE — PROGRESS NOTES
Patient:   Claudia Melchor            MRN: [id number]            FIN: [id number]               Age:   80 years     Sex:  Female     :  1940   Associated Diagnoses:   None   Author:   Carlos Kidd MD      Chief Complaint       2020 13:18 CST      Medicare Wellness    2020 13:11 CST      Medicare Wellness        History of Present Illness   The patient is an 80-year-old woman in for wellness check.  Complains of persistent dysuria.  She does not feel there is a yeast infection.  She had been treated with an antifungal topically for same without any real benefit.  It sounds like she saw a GYN nurse practitioner in the past.    Otherwise she is doing reasonably well.  Still having occasional palpitations without associated symptomology.      Health Status   Allergies:    Allergic Reactions (Selected)  Severity Not Documented  Levaquin- Refused.  Penicillin- Extreme drop in bp and shock.  Percocet 10/325- No reactions were documented.  Soy- Itching......  Tramadol- Itching.  Nonallergic Reactions (Selected)  Severity Not Documented  Codeine- N/v.  Keflex- Rash.  Neurontin- Does not recall reaction.  Sulfamethoxazole- Rash.,    Allergies (9) Active Reaction  codeine N/V  Keflex Rash  Levaquin Refused  Neurontin does not recall reaction  Penicillin shock  Percocet 10/325 None Documented  Soy Itching.....  sulfamethoxazole rash  Tramadol itching     Current medications:  (Selected)   Outpatient Medications  Pending Complete  midazolam: 2 mg, form: Injection, IV Push, q5min, Order duration: 2 dose(s), first dose 16 8:54:00 CST, stop date 16 9:03:00 CST, (up to 5 mg for moderate anxiety), 30,025  Prescriptions  Prescribed  Claritin 10 mg oral tablet: 10 mg = 1 tab(s), Oral, Daily, # 30 tab(s), 11 Refill(s), other reason (Rx)  Lasix 20 mg oral tablet: 20 mg = 1 tab(s), Oral, Daily, PRN PRN edema, # 30 tab(s), 5 Refill(s), Pharmacy: [facility] 162, cm, Height/Length Dosing,  11/02/20 13:22:00 CST, 92.75, kg, Weight Dosing, 11/02/20 13:22:00 CST  Metoprolol Succinate ER 50 mg oral tablet extended release: 50 mg = 1 tab(s), Oral, Daily, # 30 tab(s), 11 Refill(s), Pharmacy: [facility], 162, cm, Height/Length Dosing, 06/22/20 14:29:00 CDT, 94, kg, Weight Dosing, 06/22/20 14:29:00 CDT  aspirin 81 mg oral tablet, CHEWABLE: 81 mg = 1 tab(s), Oral, Daily, # 30 tab(s), 0 Refill(s), Pharmacy: [facility], 162, cm, Height/Length Dosing, 09/22/20 13:11:00 CDT, 93.5, kg, Weight Dosing, 09/22/20 13:11:00 CDT  clotrimazole 1% vaginal cream with applicator: See Instructions, INSERT ONE APPLICATORFUL VAGINALLY ONCE DAILY FOR 5 DAYS, # 45 gm, 11 Refill(s), eRx: [facility] - [place], [place]  meclizine 25 mg oral tablet: See Instructions, TAKE ONE TABLET BY MOUTH THREE TIMES DAILY AS NEEDED DIZZINESS, # 30 tab(s), 5 Refill(s), Pharmacy: [facility], 162, cm, Height/Length Dosing, 01/28/20 13:08:00 CST, 94.5, kg, Weight Dosing, 01/28/20 13:08:00 CST  oxybutynin 5 mg oral tablet: 5 mg = 1 tab(s), Oral, BID, replaces detrol, # 60 tab(s), 11 Refill(s), Pharmacy: [facility], 162, cm, Height/Length Dosing, 06/22/20 14:29:00 CDT, 94, kg, Weight Dosing, 06/22/20 14:29:00 CDT  Documented Medications  Documented  FeroSul 325 mg (65 mg elemental iron) oral tablet: 325 mg = 1 tab(s), Oral, Daily, 0 Refill(s)  MVI with Minerals (Adult Tab): 1 tab(s), Oral, Daily, # 30 tab(s), 0 Refill(s)  Mapap 500 mg oral tablet: 500 mg = 1 tab(s), Oral, q6hr, PRN PRN as needed for pain, 0 Refill(s)  Vitamin D3 5000 intl units (125 mcg) oral capsule: 5,000 IntUnit = 1 cap(s), Oral, Daily, with food, # 100 cap(s), 0 Refill(s)  metaxalone 800 mg oral tablet: 800 mg = 1 tab(s), Oral, Daily, 0 Refill(s),    Home Medications (12) Active  aspirin 81 mg oral tablet, CHEWABLE 81 mg = 1 tab(s), Oral, Daily  Claritin 10 mg oral tablet 10 mg = 1 tab(s), Oral, Daily  clotrimazole 1% vaginal cream with applicator See  Instructions  FeroSul 325 mg (65 mg elemental iron) oral tablet 325 mg = 1 tab(s), Oral, Daily  Lasix 20 mg oral tablet 20 mg = 1 tab(s), PRN, Oral, Daily  Mapap 500 mg oral tablet 500 mg = 1 tab(s), PRN, Oral, q6hr  meclizine 25 mg oral tablet See Instructions  metaxalone 800 mg oral tablet 800 mg = 1 tab(s), Oral, Daily  Metoprolol Succinate ER 50 mg oral tablet extended release 50 mg = 1 tab(s), Oral, Daily  MVI with Minerals (Adult Tab) 1 tab(s), Oral, Daily  oxybutynin 5 mg oral tablet 5 mg = 1 tab(s), Oral, BID  Vitamin D3 5000 intl units (125 mcg) oral capsule 5,000 IntUnit = 1 cap(s), Oral, Daily     Problem list:    All Problems  Annual wellness visit / SNOMED CT 088088357670119 / Confirmed  Presence of left artificial knee joint / SNOMED CT 4900409629 / Confirmed  OAB (overactive bladder)(  Confirmed  ) / SNOMED CT 127039255 / Confirmed  Carotid artery stenosis / ICD-9-.10 / Confirmed  Degenerative Joint Disease(  Confirmed  ) / SNOMED CT 072750678 / Confirmed  Dysuria / SNOMED CT 37708279 / Confirmed  Hypertension, essential(  Confirmed  ) / SNOMED CT 19628376 / Confirmed  Fibromyalgia / SNOMED CT 75855662 / Confirmed  Hypercalcemia / SNOMED CT 873276601 / Confirmed  Numbness of legs / SNOMED CT 345666626 / Confirmed  Obesity / ICD-9-.00 / Confirmed  Primary osteoarthritis of first carpometacarpal joint of left hand / SNOMED CT 15647588 / Confirmed  Frequent falls / SNOMED CT 873191528 / Confirmed  Stress fracture, pelvis, initial encounter for fracture / SNOMED CT 1393263203 / Confirmed  Well adult / SNOMED CT 663386023 / Confirmed  Resolved: Able to lie down / SNOMED CT 193330608  Resolved: Bulging lumbar disc / SNOMED CT [id number]-[id number]  Resolved: Chronic back pain / SNOMED CT C7K55T6O-2N23-91S5-[id number]-[id number]  Resolved: CL - Contact lens / SNOMED CT 2486730301  Resolved: Exercise tolerance / SNOMED CT 758773136  Resolved: Meniscus tear / SNOMED CT  26VU365J-[id number]-GNZ4UX9G89O7  Resolved: Obesity / SNOMED CT [id number]-S6G2036U1Y9M  Resolved: Reflux / SNOMED CT HN9A7Z86-449U-[id number]  in past  Resolved: UI (urinary incontinence) / SNOMED CT 43ZAB663-5F9K-[id number]-[id number]  Canceled: can lie flat / SNOMED CT 727330811  Canceled: can walk 2 blocks without CP or SOB / SNOMED CT 618372685  Canceled: Epistaxis / SNOMED CT 443832334  Canceled: chronic back/neck pain / SNOMED CT 179154970  Canceled: Contusion of left knee / SNOMED CT 32081377  Canceled: Cough / SNOMED CT BHG86265-Z4Z0-6135-VGW0-4M615H06M27F  Canceled: Diverticulitis / SNOMED CT 50022261  mild  Canceled: Right hip pain / SNOMED CT 51803870  Canceled: Ongoing use of possibly toxic medication / ICD-9-CM V58.69  Canceled: DJD (degenerative joint disease) / SNOMED CT 5915276461  Canceled: Wears glasses / SNOMED CT 887587143  and contacts      Histories   Past Medical History:    Active  Fibromyalgia (23025059)  Resolved  Reflux ([id number]):  Resolved.  Comments:  5/15/2013 CDT 11:46 CDT - Tatiana Mata RN  in past  CL - Contact lens (9998739773):  Resolved.  Obesity (S1368J28-4582-6A32-J67A-I6H2849M2N7V):  Resolved.  Exercise tolerance (279752328):  Resolved.  Able to lie down (553494439):  Resolved.  UI (urinary incontinence) (15WUS303-0H9J-[id number]-Y6L88Z4072FC):  Resolved.  Chronic back pain (K9E20D3L-3F32-29R2-UY3W-9U888RVXDD45):  Resolved.  Bulging lumbar disc (87532VN6-03LY-895N-9163-35W5E19K7970):  Resolved.  Meniscus tear (33BE201J-1N3U-9WZ3-O0ED-HLL2UK3T99Y9):  Resolved.   Family History:    Stroke  Brother  Primary malignant neoplasm of prostate  Brother  Heart failure.  Father     Procedure history:    Colonoscopy ([id number]) on 6/19/2019 at 79 Years.  Arthroscopy Knee (Right) on 1/7/2016 at 75 Years.  Comments:  1/7/2016 10:32 KOKI - Lejeune RN, Lore Felix  auto-populated from documented surgical case  Rotator cuff (62123091)  on 9/11/2014 at 74 Years.  Rotator Cuff Repair (Left) on 9/11/2014 at 74 Years.  Comments:  9/11/2014 7:23 SHELIA Palma RN, Do ULLOA  auto-populated from documented surgical case  Rotator Cuff Repair (Left) on 6/5/2014 at 74 Years.  Comments:  6/5/2014 13:33 SHELIA Mustafa RN, Ana QUIGLEY  auto-populated from documented surgical case  Lt total knee replacement in 2009 at 69 Years.  Rt Shoulder surgery in 2005 at 65 Years.  Comments:  6/3/2014 11:34 CDT - Lefort RN, Donna HARTLEY.  x 2  Hysterectomy (937153278) in 1977 at 37 Years.  Tonsillectomy and adenoidectomy (922660933) in 1945 at 5 Years.  Appendectomy (898479704) in 1940.  Rt knee surgery.  back surgery.  Comments:  6/3/2014 11:34 CDT - Lefort RN, Donna HARTLEY.  x 2  gastric bypass.  neck surgery.  left and right meniscus repair.  knee surgery x3.  Salpingectomy.   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        Physical Examination   Vital Signs   11/2/2020 13:18 CST      Temperature Oral          37.0 DegC                             Temperature Oral (calculated)             98.60 DegF                             Peripheral Pulse Rate     89 bpm                             Respiratory Rate          12 br/min                              SpO2                      95 %                             Systolic Blood Pressure   118 mmHg                             Diastolic Blood Pressure  64 mmHg                             Blood Pressure Location   Right arm                             Manual Cuff BP            Yes     Measurements from flowsheet : Measurements   11/2/2020 13:18 CST      Weight Dosing             92.750 kg                             Weight Measured           92.75 kg                             Weight Measured and Calculated in Lbs     204.48 lb                             Height/Length Dosing      162.00 cm                             Height/Length Measured    162 cm                             BSA Measured              2.04 m2                             Body Mass Index Measured  35.34 kg/m2     Vital signs are stable.    General appearance is unremarkable. Patient is in no distress    HEENT exam reveals clear sclera and reactive pupils. Throat clear. Tympanic membranes clear.    Neck supple without thyromegaly or adenopathy. Carotid pulses 2+ bilaterally without bruits. No jugular venous distention.    Heart has a regular rate and rhythm without murmurs gallops or rubs    Lungs clear    Abdomen is nontender and without mass or hepatosplenomegaly.    Back without CVAT.    Extremities without clubbing cyanosis or edema. Foot pulses palpable.      Review / Management   Lab work shows mildly elevated calcium level.  She is on HCTZ, calcium supplements, and vitamin D supplements.  She is status post gastric bypass many years ago.      Impression and Plan   1.  Wellness    2.  History of overactive bladder    3.  Dysuria.  Presumably thought to be caused by vaginitis.  Rule out UTI    4.  Hypercalcemia.  Evolving over the last several months.  Never really addressed.  Could be related to medication such as HCTZ vitamin D calcium    5.  Degenerative joint disease status post knee replacement    6.  Carotid artery stenosis    7.   Palpitations.  Controlled with beta-blocker    The plan will be to check a urinalysis today.  If normal refer to GYN.  She will find out who she was seeing before and let us know    Discontinue HCTZ and replace it with Lasix 20 daily as needed edema.  She will also stop the Tums as a calcium supplement.    Follow-up with me 6 weeks with CMP lipid MND 25 level prior

## 2022-09-28 ENCOUNTER — OFFICE VISIT (OUTPATIENT)
Dept: INTERNAL MEDICINE | Facility: CLINIC | Age: 82
End: 2022-09-28
Payer: MEDICARE

## 2022-09-28 VITALS
OXYGEN SATURATION: 96 % | SYSTOLIC BLOOD PRESSURE: 125 MMHG | RESPIRATION RATE: 18 BRPM | BODY MASS INDEX: 36.29 KG/M2 | WEIGHT: 204.81 LBS | DIASTOLIC BLOOD PRESSURE: 63 MMHG | HEART RATE: 86 BPM | HEIGHT: 63 IN

## 2022-09-28 DIAGNOSIS — N32.81 OVERACTIVE BLADDER: ICD-10-CM

## 2022-09-28 DIAGNOSIS — I10 HYPERTENSION, UNSPECIFIED TYPE: Primary | ICD-10-CM

## 2022-09-28 DIAGNOSIS — I65.29 STENOSIS OF CAROTID ARTERY, UNSPECIFIED LATERALITY: ICD-10-CM

## 2022-09-28 DIAGNOSIS — M15.9 GENERALIZED OSTEOARTHRITIS: ICD-10-CM

## 2022-09-28 PROBLEM — E66.9 OBESITY: Status: ACTIVE | Noted: 2022-09-28

## 2022-09-28 PROBLEM — R29.6 FREQUENT FALLS: Status: ACTIVE | Noted: 2022-09-28

## 2022-09-28 PROBLEM — M79.7 FIBROMYOSITIS: Status: ACTIVE | Noted: 2022-09-28

## 2022-09-28 PROCEDURE — 99214 OFFICE O/P EST MOD 30 MIN: CPT | Mod: ,,, | Performed by: INTERNAL MEDICINE

## 2022-09-28 PROCEDURE — 99214 PR OFFICE/OUTPT VISIT, EST, LEVL IV, 30-39 MIN: ICD-10-PCS | Mod: ,,, | Performed by: INTERNAL MEDICINE

## 2022-09-28 RX ORDER — ACETAMINOPHEN 500 MG
TABLET ORAL EVERY 6 HOURS PRN
COMMUNITY
Start: 2022-09-19

## 2022-09-28 RX ORDER — DILTIAZEM HYDROCHLORIDE 120 MG/1
120 CAPSULE, COATED, EXTENDED RELEASE ORAL DAILY
COMMUNITY
Start: 2022-09-19 | End: 2023-01-18

## 2022-09-28 RX ORDER — METAXALONE 800 MG/1
800 TABLET ORAL EVERY 8 HOURS PRN
COMMUNITY
Start: 2022-09-19

## 2022-09-28 RX ORDER — METRONIDAZOLE 500 MG/1
500 TABLET ORAL 3 TIMES DAILY
COMMUNITY
Start: 2022-09-23 | End: 2023-03-29

## 2022-09-28 RX ORDER — SPIRONOLACTONE 100 MG/1
TABLET, FILM COATED ORAL
COMMUNITY
Start: 2022-08-22 | End: 2022-11-08 | Stop reason: SDUPTHER

## 2022-09-28 NOTE — PROGRESS NOTES
"Subjective:       Patient ID: Renata Melchor is a 82 y.o. female.    Chief Complaint: Follow-up      The patient is an 82-year-old woman in for follow-up of multiple problems.  She was doing well except for occasional nocturnal palpitations.  She has seen Dr. Candelaria for this problem.  She also thinks she recently had a vaginitis but that resolved.      She does follow-up with her vascular surgeon regarding carotid stenosis    She is very upset today because  was with her and the lobby waiting for her to be seen.  He has a history of orthostatic hypotension and she checked his blood pressure and found the systolic to be 88.  She mistakenly gave him a 0.1 mg clonidine tablet  (that was about 30 minutes ago and I did check his blood pressure personally and it was 100/58 and he was in no distress).      Follow-up    Review of Systems     Current Outpatient Medications on File Prior to Visit   Medication Sig Dispense Refill    acetaminophen (TYLENOL) 500 MG tablet Take by mouth every 6 (six) hours as needed.      diltiaZEM (CARDIZEM CD) 120 MG Cp24 Take 120 mg by mouth once daily.      metaxalone (SKELAXIN) 800 MG tablet Take 800 mg by mouth every 8 (eight) hours as needed.      metroNIDAZOLE (FLAGYL) 500 MG tablet Take 500 mg by mouth 3 (three) times daily.      spironolactone (ALDACTONE) 100 MG tablet TAKE TWO TABLETS BY MOUTH ONCE DAILY IN THE MORNING AS DIRECTED       No current facility-administered medications on file prior to visit.     Objective:      /63 (BP Location: Right arm, Patient Position: Sitting, BP Method: Large (Manual))   Pulse 86   Resp 18   Ht 5' 3" (1.6 m)   Wt 92.9 kg (204 lb 12.8 oz)   SpO2 96%   BMI 36.28 kg/m²     Physical Exam  Vitals reviewed.   Constitutional:       Appearance: Normal appearance.   HENT:      Head: Normocephalic.      Nose: Nose normal.      Mouth/Throat:      Pharynx: Oropharynx is clear.   Eyes:      Pupils: Pupils are equal, round, and reactive to light. "   Neck:      Thyroid: No thyromegaly.   Cardiovascular:      Rate and Rhythm: Normal rate and regular rhythm.      Pulses: Normal pulses.   Abdominal:      General: Abdomen is flat. Bowel sounds are normal.      Palpations: Abdomen is soft. There is no hepatomegaly, splenomegaly or mass.      Tenderness: There is no abdominal tenderness.   Musculoskeletal:      Cervical back: Neck supple.   Lymphadenopathy:      Cervical: No cervical adenopathy.   Skin:     General: Skin is warm and dry.   Neurological:      Mental Status: She is alert.     Lab work pending  Assessment:       1. Hypertension, unspecified type    2. Stenosis of carotid artery, unspecified laterality    3. Generalized osteoarthritis    4. Overactive bladder        1. Hypertension.  Controlled    2. Carotid stenosis.  Followed by CV surgery     3. Generalized osteoarthritis.  Stable     4. Overactive bladder.      6. Generalized failure to thrive  Plan:       Continue same meds.  Review lab work when available.  Okay for flu shot today.  Follow up with me annual checkup 6 months with CBC CMP lipid TSH prior

## 2022-09-29 ENCOUNTER — DOCUMENTATION ONLY (OUTPATIENT)
Dept: INTERNAL MEDICINE | Facility: CLINIC | Age: 82
End: 2022-09-29
Payer: MEDICARE

## 2022-09-29 PROCEDURE — 90694 VACC AIIV4 NO PRSRV 0.5ML IM: CPT | Mod: ,,, | Performed by: INTERNAL MEDICINE

## 2022-09-29 PROCEDURE — 90694 FLU VACCINE - QUADRIVALENT - ADJUVANTED: ICD-10-PCS | Mod: ,,, | Performed by: INTERNAL MEDICINE

## 2022-09-29 PROCEDURE — G0008 ADMIN INFLUENZA VIRUS VAC: HCPCS | Mod: ,,, | Performed by: INTERNAL MEDICINE

## 2022-09-29 PROCEDURE — G0008 FLU VACCINE - QUADRIVALENT - ADJUVANTED: ICD-10-PCS | Mod: ,,, | Performed by: INTERNAL MEDICINE

## 2022-09-30 ENCOUNTER — DOCUMENT SCAN (OUTPATIENT)
Dept: HOME HEALTH SERVICES | Facility: HOSPITAL | Age: 82
End: 2022-09-30
Payer: MEDICARE

## 2022-10-03 ENCOUNTER — TELEPHONE (OUTPATIENT)
Dept: INTERNAL MEDICINE | Facility: CLINIC | Age: 82
End: 2022-10-03
Payer: MEDICARE

## 2022-10-03 NOTE — TELEPHONE ENCOUNTER
Cynthia with [facility] called requesting verbal order for Physical Therapy due to increase Sciatic Nerve Pain    Verbal Order given

## 2022-10-07 ENCOUNTER — DOCUMENT SCAN (OUTPATIENT)
Dept: HOME HEALTH SERVICES | Facility: HOSPITAL | Age: 82
End: 2022-10-07
Payer: MEDICARE

## 2022-10-10 ENCOUNTER — EXTERNAL HOME HEALTH (OUTPATIENT)
Dept: HOME HEALTH SERVICES | Facility: HOSPITAL | Age: 82
End: 2022-10-10
Payer: MEDICARE

## 2022-11-08 ENCOUNTER — DOCUMENT SCAN (OUTPATIENT)
Dept: HOME HEALTH SERVICES | Facility: HOSPITAL | Age: 82
End: 2022-11-08
Payer: MEDICARE

## 2022-11-08 ENCOUNTER — TELEPHONE (OUTPATIENT)
Dept: INTERNAL MEDICINE | Facility: CLINIC | Age: 82
End: 2022-11-08
Payer: MEDICARE

## 2022-11-08 DIAGNOSIS — R60.9 SWELLING: Primary | ICD-10-CM

## 2022-11-08 RX ORDER — SPIRONOLACTONE 100 MG/1
100 TABLET, FILM COATED ORAL DAILY
Qty: 30 TABLET | Refills: 5 | Status: SHIPPED | OUTPATIENT
Start: 2022-11-08 | End: 2023-05-01

## 2022-11-09 ENCOUNTER — TELEPHONE (OUTPATIENT)
Dept: INTERNAL MEDICINE | Facility: CLINIC | Age: 82
End: 2022-11-09
Payer: MEDICARE

## 2022-11-09 RX ORDER — MELOXICAM 15 MG/1
15 TABLET ORAL DAILY
Qty: 30 TABLET | Refills: 0 | Status: SHIPPED | OUTPATIENT
Start: 2022-11-09

## 2022-11-09 NOTE — TELEPHONE ENCOUNTER
Patient was here with her  requested a medicine for shoulder pain.  She is seeing a chiropractor.  She wants a called in to test drugs and I will send Mobic.

## 2022-11-14 ENCOUNTER — TELEPHONE (OUTPATIENT)
Dept: INTERNAL MEDICINE | Facility: CLINIC | Age: 82
End: 2022-11-14
Payer: MEDICARE

## 2022-12-02 ENCOUNTER — HOSPITAL ENCOUNTER (OUTPATIENT)
Dept: RADIOLOGY | Facility: HOSPITAL | Age: 82
Discharge: HOME | End: 2022-12-02
Attending: OBSTETRICS & GYNECOLOGY
Payer: MEDICARE

## 2022-12-02 DIAGNOSIS — Z12.31 ENCOUNTER FOR SCREENING MAMMOGRAM FOR BREAST CANCER: ICD-10-CM

## 2022-12-02 PROCEDURE — 77067 MAMMO DIGITAL SCREENING BILAT WITH TOMO: ICD-10-PCS | Mod: 26,,, | Performed by: STUDENT IN AN ORGANIZED HEALTH CARE EDUCATION/TRAINING PROGRAM

## 2022-12-02 PROCEDURE — 77063 BREAST TOMOSYNTHESIS BI: CPT | Mod: 26,,, | Performed by: STUDENT IN AN ORGANIZED HEALTH CARE EDUCATION/TRAINING PROGRAM

## 2022-12-02 PROCEDURE — 77067 SCR MAMMO BI INCL CAD: CPT | Mod: 26,,, | Performed by: STUDENT IN AN ORGANIZED HEALTH CARE EDUCATION/TRAINING PROGRAM

## 2022-12-02 PROCEDURE — 77063 BREAST TOMOSYNTHESIS BI: CPT | Mod: TC

## 2022-12-02 PROCEDURE — 77063 MAMMO DIGITAL SCREENING BILAT WITH TOMO: ICD-10-PCS | Mod: 26,,, | Performed by: STUDENT IN AN ORGANIZED HEALTH CARE EDUCATION/TRAINING PROGRAM

## 2023-03-22 ENCOUNTER — TELEPHONE (OUTPATIENT)
Dept: INTERNAL MEDICINE | Facility: CLINIC | Age: 83
End: 2023-03-22
Payer: MEDICARE

## 2023-03-22 NOTE — TELEPHONE ENCOUNTER
----- Message from Leticia Hill LPN sent at 3/22/2023  7:28 AM CDT -----  Regarding: felice Wong 3/29 @1:40  Fasting labs needed.

## 2023-03-29 ENCOUNTER — OFFICE VISIT (OUTPATIENT)
Dept: INTERNAL MEDICINE | Facility: CLINIC | Age: 83
End: 2023-03-29
Payer: MEDICARE

## 2023-03-29 VITALS
HEIGHT: 63 IN | SYSTOLIC BLOOD PRESSURE: 114 MMHG | WEIGHT: 213 LBS | RESPIRATION RATE: 18 BRPM | DIASTOLIC BLOOD PRESSURE: 72 MMHG | HEART RATE: 99 BPM | BODY MASS INDEX: 37.74 KG/M2 | OXYGEN SATURATION: 94 %

## 2023-03-29 DIAGNOSIS — I65.29 STENOSIS OF CAROTID ARTERY, UNSPECIFIED LATERALITY: ICD-10-CM

## 2023-03-29 DIAGNOSIS — F32.A DEPRESSION, UNSPECIFIED DEPRESSION TYPE: ICD-10-CM

## 2023-03-29 DIAGNOSIS — R73.9 HYPERGLYCEMIA: ICD-10-CM

## 2023-03-29 DIAGNOSIS — I10 HYPERTENSION, UNSPECIFIED TYPE: ICD-10-CM

## 2023-03-29 DIAGNOSIS — E83.52 HYPERCALCEMIA: ICD-10-CM

## 2023-03-29 DIAGNOSIS — Z00.00 WELLNESS EXAMINATION: Primary | ICD-10-CM

## 2023-03-29 DIAGNOSIS — R53.83 FATIGUE, UNSPECIFIED TYPE: ICD-10-CM

## 2023-03-29 DIAGNOSIS — M15.9 GENERALIZED OSTEOARTHRITIS: ICD-10-CM

## 2023-03-29 PROCEDURE — G0439 PPPS, SUBSEQ VISIT: HCPCS | Mod: ,,, | Performed by: INTERNAL MEDICINE

## 2023-03-29 PROCEDURE — G0439 PR MEDICARE ANNUAL WELLNESS SUBSEQUENT VISIT: ICD-10-PCS | Mod: ,,, | Performed by: INTERNAL MEDICINE

## 2023-03-29 RX ORDER — CEFDINIR 300 MG/1
300 CAPSULE ORAL 2 TIMES DAILY
COMMUNITY
Start: 2022-12-22 | End: 2023-03-29

## 2023-03-29 NOTE — PROGRESS NOTES
Subjective:      Patient Care Team:  Carlos Kidd MD as PCP - General (Internal Medicine)  Carlos Kidd MD      Patient ID: Renata Melchor is a 82 y.o. female.    Chief Complaint: Follow-up (6 moth follow up)      The patient is an 82-year-old woman in for wellness check.  She does not feel well.  She really is not well.  She has no particular complaints.  She is upset about her 's gradual decline.  She is upset about her current situation in life.  We discussed depression and she agrees that she is depressed but is not interested in medicine or any type of treatment.      She is requesting home health services.  They had [facility] in the past and she would like them again.    They have trouble getting around and when a unable to get the blood drawn as ordered for this visit.    Follow-up    Review of Systems   All other systems reviewed and are negative.     Current Outpatient Medications on File Prior to Visit   Medication Sig Dispense Refill    acetaminophen (TYLENOL) 500 MG tablet Take by mouth every 6 (six) hours as needed.      diltiaZEM (CARDIZEM CD) 120 MG Cp24 TAKE ONE CAPSULE BY MOUTH ONCE DAILY. 30 capsule 11    meloxicam (MOBIC) 15 MG tablet Take 1 tablet (15 mg total) by mouth once daily. 30 tablet 0    metaxalone (SKELAXIN) 800 MG tablet Take 800 mg by mouth every 8 (eight) hours as needed.      spironolactone (ALDACTONE) 100 MG tablet Take 1 tablet (100 mg total) by mouth once daily. 30 tablet 5    [DISCONTINUED] cefdinir (OMNICEF) 300 MG capsule Take 300 mg by mouth 2 (two) times daily.      [DISCONTINUED] metroNIDAZOLE (FLAGYL) 500 MG tablet Take 500 mg by mouth 3 (three) times daily.       No current facility-administered medications on file prior to visit.       Patient Reported Health Risk Assessment       Opioid Screening: Patient medication list reviewed, patient is not taking prescription opioids. Patient is not using additional opioids than prescribed. Patient is at low risk of  "substance abuse based on this opioid use history.       Objective:      /72 (BP Location: Right arm, Patient Position: Sitting, BP Method: Large (Manual))   Pulse 99   Resp 18   Ht 5' 3" (1.6 m)   Wt 96.6 kg (213 lb)   SpO2 (!) 94%   BMI 37.73 kg/m²     Physical Exam  Vitals reviewed.   Constitutional:       Appearance: Normal appearance.   HENT:      Head: Normocephalic.      Nose: Nose normal.      Mouth/Throat:      Pharynx: Oropharynx is clear.   Eyes:      Pupils: Pupils are equal, round, and reactive to light.   Neck:      Thyroid: No thyromegaly.   Cardiovascular:      Rate and Rhythm: Normal rate and regular rhythm.      Pulses: Normal pulses.   Abdominal:      General: Abdomen is flat. Bowel sounds are normal.      Palpations: Abdomen is soft. There is no hepatomegaly, splenomegaly or mass.      Tenderness: There is no abdominal tenderness.   Musculoskeletal:      Cervical back: Neck supple.   Lymphadenopathy:      Cervical: No cervical adenopathy.   Skin:     General: Skin is warm and dry.   Neurological:      Mental Status: She is alert.           No flowsheet data found.  Fall Risk Assessment - Outpatient 3/29/2023 9/28/2022   Mobility Status Ambulatory w/ assistance Ambulatory w/ assistance   Number of falls 1 0   Identified as fall risk 1 0                Assessment:        1. Wellness    2. Hypertension.  Control    3. History of hyperglycemia.  Status unknown but likely worse with weight gain    4. History of hypercalcemia    5. Generalized fatigue    6. Depression.  She refuses medication but will as know if she wants to try in the future.      7. Carotid stenosis.  Followed elsewhere    8. Generalized osteoarthritis    Plan:     Would like to have blood drawn to include a CBC CMP lipid TSH prior.  Agree with current meds.  Did recommend starting SSRI therapy but she refuses.  Tentative follow-up 6 months.  Home health consultation per her request.          Medicare Annual Wellness and " Personalized Prevention Plan:   Fall Risk + Home Safety + Hearing Impairment + Depression Screen + Cognitive Impairment Screen + Health Risk Assessment all reviewed.       Health Maintenance Topics with due status: Not Due       Topic Last Completion Date    Lipid Panel 09/27/2022      The patient's Health Maintenance was reviewed and the following appears to be due at this time:   Health Maintenance Due   Topic Date Due    TETANUS VACCINE  Never done    DEXA Scan  Never done    COVID-19 Vaccine (4 - Booster for Pfizer series) 12/15/2021         Advance Care Planning   I attest to discussing Advance Care Planning with patient and/or family member.  Education was provided including the importance of the Health Care Power of , Advance Directives, and/or [place] documentation.  The patient expressed understanding to the importance of this information and discussion.  Length of ACP conversation in minutes:          Follow up in about 6 months (around 9/29/2023). In addition to their scheduled follow up, the patient has also been instructed to follow up on as needed basis.

## 2023-04-11 PROCEDURE — G0180 PR HOME HEALTH MD CERTIFICATION: ICD-10-PCS | Mod: ,,, | Performed by: INTERNAL MEDICINE

## 2023-04-11 PROCEDURE — G0180 MD CERTIFICATION HHA PATIENT: HCPCS | Mod: ,,, | Performed by: INTERNAL MEDICINE

## 2023-04-12 ENCOUNTER — LAB REQUISITION (OUTPATIENT)
Dept: LAB | Facility: HOSPITAL | Age: 83
End: 2023-04-12
Attending: INTERNAL MEDICINE
Payer: MEDICARE

## 2023-04-12 ENCOUNTER — TELEPHONE (OUTPATIENT)
Dept: INTERNAL MEDICINE | Facility: CLINIC | Age: 83
End: 2023-04-12
Payer: MEDICARE

## 2023-04-12 DIAGNOSIS — M15.9 POLYOSTEOARTHRITIS, UNSPECIFIED: ICD-10-CM

## 2023-04-12 DIAGNOSIS — E83.52 HYPERCALCEMIA: ICD-10-CM

## 2023-04-12 DIAGNOSIS — I65.29 OCCLUSION AND STENOSIS OF UNSPECIFIED CAROTID ARTERY: ICD-10-CM

## 2023-04-12 DIAGNOSIS — R53.83 OTHER FATIGUE: ICD-10-CM

## 2023-04-12 DIAGNOSIS — I10 ESSENTIAL (PRIMARY) HYPERTENSION: ICD-10-CM

## 2023-04-12 DIAGNOSIS — R73.9 HYPERGLYCEMIA, UNSPECIFIED: ICD-10-CM

## 2023-04-12 DIAGNOSIS — R10.9 FLANK PAIN: Primary | ICD-10-CM

## 2023-04-12 LAB
ALBUMIN SERPL-MCNC: 3.9 G/DL (ref 3.4–4.8)
ALBUMIN/GLOB SERPL: 1.3 RATIO (ref 1.1–2)
ALP SERPL-CCNC: 60 UNIT/L (ref 40–150)
ALT SERPL-CCNC: 21 UNIT/L (ref 0–55)
AST SERPL-CCNC: 16 UNIT/L (ref 5–34)
BASOPHILS # BLD AUTO: 0.05 X10(3)/MCL (ref 0–0.2)
BASOPHILS NFR BLD AUTO: 0.6 %
BILIRUBIN DIRECT+TOT PNL SERPL-MCNC: 0.9 MG/DL
BUN SERPL-MCNC: 18.2 MG/DL (ref 9.8–20.1)
CALCIUM SERPL-MCNC: 10.2 MG/DL (ref 8.4–10.2)
CHLORIDE SERPL-SCNC: 105 MMOL/L (ref 98–107)
CHOLEST SERPL-MCNC: 221 MG/DL
CHOLEST/HDLC SERPL: 4 {RATIO} (ref 0–5)
CO2 SERPL-SCNC: 25 MMOL/L (ref 23–31)
CREAT SERPL-MCNC: 0.78 MG/DL (ref 0.55–1.02)
EOSINOPHIL # BLD AUTO: 0.25 X10(3)/MCL (ref 0–0.9)
EOSINOPHIL NFR BLD AUTO: 3.2 %
ERYTHROCYTE [DISTWIDTH] IN BLOOD BY AUTOMATED COUNT: 12.8 % (ref 11.5–17)
EST. AVERAGE GLUCOSE BLD GHB EST-MCNC: 119.8 MG/DL
GFR SERPLBLD CREATININE-BSD FMLA CKD-EPI: >60 MLS/MIN/1.73/M2
GLOBULIN SER-MCNC: 3.1 GM/DL (ref 2.4–3.5)
GLUCOSE SERPL-MCNC: 120 MG/DL (ref 82–115)
HBA1C MFR BLD: 5.8 %
HCT VFR BLD AUTO: 45.8 % (ref 37–47)
HDLC SERPL-MCNC: 51 MG/DL (ref 35–60)
HGB BLD-MCNC: 15.4 G/DL (ref 12–16)
IMM GRANULOCYTES # BLD AUTO: 0.04 X10(3)/MCL (ref 0–0.04)
IMM GRANULOCYTES NFR BLD AUTO: 0.5 %
LDLC SERPL CALC-MCNC: 140 MG/DL (ref 50–140)
LYMPHOCYTES # BLD AUTO: 1.52 X10(3)/MCL (ref 0.6–4.6)
LYMPHOCYTES NFR BLD AUTO: 19.3 %
MCH RBC QN AUTO: 32.6 PG (ref 27–31)
MCHC RBC AUTO-ENTMCNC: 33.6 G/DL (ref 33–36)
MCV RBC AUTO: 96.8 FL (ref 80–94)
MONOCYTES # BLD AUTO: 0.67 X10(3)/MCL (ref 0.1–1.3)
MONOCYTES NFR BLD AUTO: 8.5 %
NEUTROPHILS # BLD AUTO: 5.33 X10(3)/MCL (ref 2.1–9.2)
NEUTROPHILS NFR BLD AUTO: 67.9 %
NRBC BLD AUTO-RTO: 0 %
PLATELET # BLD AUTO: 205 X10(3)/MCL (ref 130–400)
PMV BLD AUTO: 9.4 FL (ref 7.4–10.4)
POTASSIUM SERPL-SCNC: 4.5 MMOL/L (ref 3.5–5.1)
PROT SERPL-MCNC: 7 GM/DL (ref 5.8–7.6)
RBC # BLD AUTO: 4.73 X10(6)/MCL (ref 4.2–5.4)
SODIUM SERPL-SCNC: 141 MMOL/L (ref 136–145)
TRIGL SERPL-MCNC: 148 MG/DL (ref 37–140)
TSH SERPL-ACNC: 2.26 UIU/ML (ref 0.35–4.94)
VLDLC SERPL CALC-MCNC: 30 MG/DL
WBC # SPEC AUTO: 7.9 X10(3)/MCL (ref 4.5–11.5)

## 2023-04-12 PROCEDURE — 80061 LIPID PANEL: CPT | Performed by: INTERNAL MEDICINE

## 2023-04-12 PROCEDURE — 83036 HEMOGLOBIN GLYCOSYLATED A1C: CPT | Performed by: INTERNAL MEDICINE

## 2023-04-12 PROCEDURE — 85025 COMPLETE CBC W/AUTO DIFF WBC: CPT | Performed by: INTERNAL MEDICINE

## 2023-04-12 PROCEDURE — 84443 ASSAY THYROID STIM HORMONE: CPT | Performed by: INTERNAL MEDICINE

## 2023-04-12 PROCEDURE — 80053 COMPREHEN METABOLIC PANEL: CPT | Performed by: INTERNAL MEDICINE

## 2023-04-13 ENCOUNTER — DOCUMENTATION ONLY (OUTPATIENT)
Dept: INTERNAL MEDICINE | Facility: CLINIC | Age: 83
End: 2023-04-13
Payer: MEDICARE

## 2023-04-14 ENCOUNTER — TELEPHONE (OUTPATIENT)
Dept: INTERNAL MEDICINE | Facility: CLINIC | Age: 83
End: 2023-04-14
Payer: MEDICARE

## 2023-04-14 NOTE — TELEPHONE ENCOUNTER
----- Message from Carlos Kidd MD sent at 4/13/2023  6:16 PM CDT -----  Tell her blood test and urine test all okay.  ----- Message -----  From: Leticia Hill LPN  Sent: 4/13/2023   1:38 PM CDT  To: Carlos Kidd MD

## 2023-04-19 ENCOUNTER — TELEPHONE (OUTPATIENT)
Dept: INTERNAL MEDICINE | Facility: CLINIC | Age: 83
End: 2023-04-19
Payer: MEDICARE

## 2023-04-21 ENCOUNTER — TELEPHONE (OUTPATIENT)
Dept: INTERNAL MEDICINE | Facility: CLINIC | Age: 83
End: 2023-04-21
Payer: MEDICARE

## 2023-04-21 NOTE — TELEPHONE ENCOUNTER
----- Message from Ana Ni sent at 4/21/2023  9:33 AM CDT -----  Regarding: advice  Type:  Needs Medical Advice    Who Called: pt  Symptoms (please be specific): toe nail fungus   How long has patient had these symptoms:  about 1 yesr  Pharmacy name and phone #:  [email or web address]  Would the patient rather a call back or a response via [facility]? C/b  Best Call Back Number: [phone]  Additional Information: pt would like pcp to order terbifine to combat her toe nail fungus     Type:  Test Results    Who Called: pt  Name of Test (Lab/Mammo/Etc): x ray  Date of Test: 4/19  Ordering Provider: toby mcgraw  Where the test was performed: in home  Would the patient rather a call back or a response via [facility]? C/b  Best Call Back Number: [phone]  Additional Information:  x-ray of ribs

## 2023-05-01 DIAGNOSIS — R60.9 SWELLING: ICD-10-CM

## 2023-05-01 RX ORDER — SPIRONOLACTONE 100 MG/1
100 TABLET, FILM COATED ORAL DAILY
Qty: 30 TABLET | Refills: 5 | Status: SHIPPED | OUTPATIENT
Start: 2023-05-01 | End: 2023-10-17

## 2023-05-12 ENCOUNTER — EXTERNAL HOME HEALTH (OUTPATIENT)
Dept: HOME HEALTH SERVICES | Facility: HOSPITAL | Age: 83
End: 2023-05-12
Payer: MEDICARE

## 2023-06-02 ENCOUNTER — TELEPHONE (OUTPATIENT)
Dept: INTERNAL MEDICINE | Facility: CLINIC | Age: 83
End: 2023-06-02
Payer: MEDICARE

## 2023-06-02 DIAGNOSIS — I10 HYPERTENSION, UNSPECIFIED TYPE: ICD-10-CM

## 2023-06-02 RX ORDER — DILTIAZEM HYDROCHLORIDE 120 MG/1
120 CAPSULE, COATED, EXTENDED RELEASE ORAL 2 TIMES DAILY
Qty: 60 CAPSULE | Status: SHIPPED | OUTPATIENT
Start: 2023-06-02 | End: 2023-06-02 | Stop reason: SDUPTHER

## 2023-06-02 RX ORDER — DILTIAZEM HYDROCHLORIDE 120 MG/1
120 CAPSULE, COATED, EXTENDED RELEASE ORAL 2 TIMES DAILY
Qty: 60 CAPSULE | Status: SHIPPED | OUTPATIENT
Start: 2023-06-02

## 2023-06-02 NOTE — TELEPHONE ENCOUNTER
----- Message from Slava Barr sent at 6/2/2023 10:26 AM CDT -----  .Type:  Needs Medical Advice    Who Called: pt  Symptoms (please be specific): blood pressure & heart beat   Would the patient rather a call back or a response via [facility]? Call back  Best Call Back Number: [phone]  Additional Information: previous message

## 2023-06-08 ENCOUNTER — TELEPHONE (OUTPATIENT)
Dept: INTERNAL MEDICINE | Facility: CLINIC | Age: 83
End: 2023-06-08
Payer: MEDICARE

## 2023-06-08 NOTE — TELEPHONE ENCOUNTER
----- Message from Courtney Jones sent at 6/8/2023 11:43 AM CDT -----  .Type:  Needs Medical Advice    Who Called: pt     Would the patient rather a call back or a response via [facility]? Call back   Best Call Back Number: [phone]  Additional Information: pt is requesting a call back she would like some acnelyse she needs needs the script mailed  to her house, a face cream

## 2023-06-10 PROCEDURE — G0179 MD RECERTIFICATION HHA PT: HCPCS | Mod: ,,, | Performed by: INTERNAL MEDICINE

## 2023-06-10 PROCEDURE — G0179 PR HOME HEALTH MD RECERTIFICATION: ICD-10-PCS | Mod: ,,, | Performed by: INTERNAL MEDICINE

## 2023-06-15 DIAGNOSIS — R26.89 DECREASED MOBILITY: ICD-10-CM

## 2023-06-15 DIAGNOSIS — R29.6 FREQUENT FALLS: Primary | ICD-10-CM

## 2023-06-15 DIAGNOSIS — R29.898 WEAKNESS OF LOWER EXTREMITY, UNSPECIFIED LATERALITY: ICD-10-CM

## 2023-06-16 ENCOUNTER — EXTERNAL HOME HEALTH (OUTPATIENT)
Dept: HOME HEALTH SERVICES | Facility: HOSPITAL | Age: 83
End: 2023-06-16
Payer: MEDICARE

## 2023-06-21 ENCOUNTER — TELEPHONE (OUTPATIENT)
Dept: INTERNAL MEDICINE | Facility: CLINIC | Age: 83
End: 2023-06-21
Payer: MEDICARE

## 2023-06-21 NOTE — TELEPHONE ENCOUNTER
Patient called stated to disregard the message from [facility], patient called [facility] and scheduled an appt on 8-24-23.

## 2023-06-21 NOTE — TELEPHONE ENCOUNTER
----- Message from Mariola Alfredo sent at 6/21/2023  3:23 PM CDT -----  Regarding: advice  Type:  Needs Medical Advice    Who Called: pt  Symptoms (please be specific:  How long has patient had these symptoms:    Pharmacy name and phone #:    Would the patient rather a call back or a response via [facility]?   Best Call Back Number: [phone]  Additional Information: pt called wanting to speak to a nurse about a recent appt she had with another doctor.

## 2023-06-22 DIAGNOSIS — R00.0 RAPID HEART BEAT: Primary | ICD-10-CM

## 2023-08-09 PROCEDURE — G0179 PR HOME HEALTH MD RECERTIFICATION: ICD-10-PCS | Mod: ,,, | Performed by: INTERNAL MEDICINE

## 2023-08-09 PROCEDURE — G0179 MD RECERTIFICATION HHA PT: HCPCS | Mod: ,,, | Performed by: INTERNAL MEDICINE

## 2023-08-15 ENCOUNTER — EXTERNAL HOME HEALTH (OUTPATIENT)
Dept: HOME HEALTH SERVICES | Facility: HOSPITAL | Age: 83
End: 2023-08-15
Payer: MEDICARE

## 2023-08-21 ENCOUNTER — TELEPHONE (OUTPATIENT)
Dept: INTERNAL MEDICINE | Facility: CLINIC | Age: 83
End: 2023-08-21
Payer: MEDICARE

## 2023-08-23 ENCOUNTER — TELEPHONE (OUTPATIENT)
Dept: INTERNAL MEDICINE | Facility: CLINIC | Age: 83
End: 2023-08-23
Payer: MEDICARE

## 2023-08-23 NOTE — TELEPHONE ENCOUNTER
----- Message from [facility] sent at 8/23/2023  2:07 PM CDT -----  Regarding: pt wants to speak to KARINA  .Type:  Needs Medical Advice    Who Called:  pt      Would the patient rather a call back? Yes    Best Call Back Number:  [phone]    Additional Information:  pt needs her long care extended she wants to speak to KARINA she also needs a copy of the letter after the doctor signs it

## 2023-08-23 NOTE — TELEPHONE ENCOUNTER
Patient called asking if LTC paperwork was received and sent in.  Patient advised no paperwork for LTC received.  Patient stated she will contact company and request it be resent.

## 2023-09-11 ENCOUNTER — TELEPHONE (OUTPATIENT)
Dept: INTERNAL MEDICINE | Facility: CLINIC | Age: 83
End: 2023-09-11
Payer: MEDICARE

## 2023-09-11 NOTE — TELEPHONE ENCOUNTER
----- Message from Medardo Bruce sent at 9/11/2023  9:43 AM CDT -----  Regarding: call back  .Type:  Needs Medical Advice    Who Called: jah   Would the patient rather a call back or a response via [facility]?   Best Call Back Number: [phone]  Additional Information: pt is requesting a call back regarding a form for extended services

## 2023-09-11 NOTE — TELEPHONE ENCOUNTER
Spoke with nurse at [facility] and she will kourtney to Jennifer to refax paperwork for pt and her .

## 2023-09-13 ENCOUNTER — TELEPHONE (OUTPATIENT)
Dept: INTERNAL MEDICINE | Facility: CLINIC | Age: 83
End: 2023-09-13
Payer: MEDICARE

## 2023-09-13 NOTE — TELEPHONE ENCOUNTER
----- Message from Courtney Jones sent at 9/13/2023 12:15 PM CDT -----  .Type:  Needs Medical Advice    Who Called: pt     Would the patient rather a call back or a response via [facility]? Call back  Best Call Back Number: [phone]  Additional Information: [facility] needs orders for blood work they go to her house on the 26th

## 2023-09-19 ENCOUNTER — TELEPHONE (OUTPATIENT)
Dept: INTERNAL MEDICINE | Facility: CLINIC | Age: 83
End: 2023-09-19
Payer: MEDICARE

## 2023-09-19 DIAGNOSIS — L29.9 ITCHY SCALP: Primary | ICD-10-CM

## 2023-09-19 NOTE — TELEPHONE ENCOUNTER
----- Message from Courtney Jones sent at 9/19/2023  2:23 PM CDT -----  .Type:  Needs Medical Advice    Who Called: [facility]  Symptoms (please be specific): hair loss   How long has patient had these symptoms:  long time   Pharmacy name and phone #:  [facility] - [place], [place] - [address] [place] [address].  Would the patient rather a call back or a response via [facility]?   Best Call Back Number: [phone]  Additional Information: pt is requesting Ketoconizole shampoo dry hair loss

## 2023-09-20 ENCOUNTER — TELEPHONE (OUTPATIENT)
Dept: INTERNAL MEDICINE | Facility: CLINIC | Age: 83
End: 2023-09-20
Payer: MEDICARE

## 2023-09-20 RX ORDER — KETOCONAZOLE 20 MG/ML
SHAMPOO, SUSPENSION TOPICAL
Qty: 120 ML | Refills: 11 | Status: SHIPPED | OUTPATIENT
Start: 2023-09-21

## 2023-09-20 NOTE — TELEPHONE ENCOUNTER
----- Message from Leticia Hill LPN sent at 9/20/2023  8:48 AM CDT -----  Regarding: felice Wong 9/27 @2:40  Fasting labs needed.

## 2023-09-26 ENCOUNTER — LAB REQUISITION (OUTPATIENT)
Dept: LAB | Facility: HOSPITAL | Age: 83
End: 2023-09-26
Payer: MEDICARE

## 2023-09-26 DIAGNOSIS — R73.9 HYPERGLYCEMIA, UNSPECIFIED: ICD-10-CM

## 2023-09-26 DIAGNOSIS — E83.52 HYPERCALCEMIA: ICD-10-CM

## 2023-09-26 DIAGNOSIS — R53.83 OTHER FATIGUE: ICD-10-CM

## 2023-09-26 LAB
ALBUMIN SERPL-MCNC: 3.9 G/DL (ref 3.4–4.8)
ALBUMIN/GLOB SERPL: 1.2 RATIO (ref 1.1–2)
ALP SERPL-CCNC: 62 UNIT/L (ref 40–150)
ALT SERPL-CCNC: 25 UNIT/L (ref 0–55)
AST SERPL-CCNC: 17 UNIT/L (ref 5–34)
BASOPHILS # BLD AUTO: 0.05 X10(3)/MCL
BASOPHILS NFR BLD AUTO: 0.5 %
BILIRUB SERPL-MCNC: 0.7 MG/DL
BUN SERPL-MCNC: 20.4 MG/DL (ref 9.8–20.1)
CALCIUM SERPL-MCNC: 9.8 MG/DL (ref 8.4–10.2)
CHLORIDE SERPL-SCNC: 105 MMOL/L (ref 98–107)
CHOLEST SERPL-MCNC: 198 MG/DL
CHOLEST/HDLC SERPL: 4 {RATIO} (ref 0–5)
CO2 SERPL-SCNC: 27 MMOL/L (ref 23–31)
CREAT SERPL-MCNC: 0.88 MG/DL (ref 0.55–1.02)
EOSINOPHIL # BLD AUTO: 0.22 X10(3)/MCL (ref 0–0.9)
EOSINOPHIL NFR BLD AUTO: 2.4 %
ERYTHROCYTE [DISTWIDTH] IN BLOOD BY AUTOMATED COUNT: 12.8 % (ref 11.5–17)
GFR SERPLBLD CREATININE-BSD FMLA CKD-EPI: >60 MLS/MIN/1.73/M2
GLOBULIN SER-MCNC: 3.3 GM/DL (ref 2.4–3.5)
GLUCOSE SERPL-MCNC: 120 MG/DL (ref 82–115)
HCT VFR BLD AUTO: 45.8 % (ref 37–47)
HDLC SERPL-MCNC: 48 MG/DL (ref 35–60)
HGB BLD-MCNC: 15.6 G/DL (ref 12–16)
IMM GRANULOCYTES # BLD AUTO: 0.03 X10(3)/MCL (ref 0–0.04)
IMM GRANULOCYTES NFR BLD AUTO: 0.3 %
LDLC SERPL CALC-MCNC: 128 MG/DL (ref 50–140)
LYMPHOCYTES # BLD AUTO: 1.47 X10(3)/MCL (ref 0.6–4.6)
LYMPHOCYTES NFR BLD AUTO: 16.2 %
MCH RBC QN AUTO: 32.8 PG (ref 27–31)
MCHC RBC AUTO-ENTMCNC: 34.1 G/DL (ref 33–36)
MCV RBC AUTO: 96.4 FL (ref 80–94)
MONOCYTES # BLD AUTO: 0.68 X10(3)/MCL (ref 0.1–1.3)
MONOCYTES NFR BLD AUTO: 7.5 %
NEUTROPHILS # BLD AUTO: 6.65 X10(3)/MCL (ref 2.1–9.2)
NEUTROPHILS NFR BLD AUTO: 73.1 %
NRBC BLD AUTO-RTO: 0 %
PLATELET # BLD AUTO: 234 X10(3)/MCL (ref 130–400)
PMV BLD AUTO: 9.8 FL (ref 7.4–10.4)
POTASSIUM SERPL-SCNC: 5 MMOL/L (ref 3.5–5.1)
PROT SERPL-MCNC: 7.2 GM/DL (ref 5.8–7.6)
RBC # BLD AUTO: 4.75 X10(6)/MCL (ref 4.2–5.4)
SODIUM SERPL-SCNC: 142 MMOL/L (ref 136–145)
TRIGL SERPL-MCNC: 112 MG/DL (ref 37–140)
TSH SERPL-ACNC: 1.6 UIU/ML (ref 0.35–4.94)
VLDLC SERPL CALC-MCNC: 22 MG/DL
WBC # SPEC AUTO: 9.1 X10(3)/MCL (ref 4.5–11.5)

## 2023-09-26 PROCEDURE — 80061 LIPID PANEL: CPT | Performed by: INTERNAL MEDICINE

## 2023-09-26 PROCEDURE — 85025 COMPLETE CBC W/AUTO DIFF WBC: CPT | Performed by: INTERNAL MEDICINE

## 2023-09-26 PROCEDURE — 84443 ASSAY THYROID STIM HORMONE: CPT | Performed by: INTERNAL MEDICINE

## 2023-09-26 PROCEDURE — 80053 COMPREHEN METABOLIC PANEL: CPT | Performed by: INTERNAL MEDICINE

## 2023-09-27 ENCOUNTER — OFFICE VISIT (OUTPATIENT)
Dept: INTERNAL MEDICINE | Facility: CLINIC | Age: 83
End: 2023-09-27
Payer: MEDICARE

## 2023-09-27 ENCOUNTER — DOCUMENTATION ONLY (OUTPATIENT)
Dept: INTERNAL MEDICINE | Facility: CLINIC | Age: 83
End: 2023-09-27

## 2023-09-27 VITALS
RESPIRATION RATE: 18 BRPM | BODY MASS INDEX: 36.86 KG/M2 | OXYGEN SATURATION: 95 % | SYSTOLIC BLOOD PRESSURE: 119 MMHG | DIASTOLIC BLOOD PRESSURE: 71 MMHG | HEIGHT: 63 IN | WEIGHT: 208 LBS | HEART RATE: 101 BPM

## 2023-09-27 DIAGNOSIS — R53.83 FATIGUE, UNSPECIFIED TYPE: ICD-10-CM

## 2023-09-27 DIAGNOSIS — R35.0 URINARY FREQUENCY: Primary | ICD-10-CM

## 2023-09-27 DIAGNOSIS — E66.01 SEVERE OBESITY (BMI 35.0-39.9) WITH COMORBIDITY: ICD-10-CM

## 2023-09-27 DIAGNOSIS — R41.3 MEMORY CHANGE: ICD-10-CM

## 2023-09-27 DIAGNOSIS — I65.23 BILATERAL CAROTID ARTERY STENOSIS: ICD-10-CM

## 2023-09-27 DIAGNOSIS — I10 PRIMARY HYPERTENSION: ICD-10-CM

## 2023-09-27 DIAGNOSIS — R06.02 EXERTIONAL SHORTNESS OF BREATH: ICD-10-CM

## 2023-09-27 DIAGNOSIS — R29.6 FREQUENT FALLS: ICD-10-CM

## 2023-09-27 PROCEDURE — G0008 FLU VACCINE - QUADRIVALENT - ADJUVANTED: ICD-10-PCS | Mod: ,,, | Performed by: INTERNAL MEDICINE

## 2023-09-27 PROCEDURE — 90694 VACC AIIV4 NO PRSRV 0.5ML IM: CPT | Mod: ,,, | Performed by: INTERNAL MEDICINE

## 2023-09-27 PROCEDURE — 99214 PR OFFICE/OUTPT VISIT, EST, LEVL IV, 30-39 MIN: ICD-10-PCS | Mod: ,,, | Performed by: NURSE PRACTITIONER

## 2023-09-27 PROCEDURE — G0008 ADMIN INFLUENZA VIRUS VAC: HCPCS | Mod: ,,, | Performed by: INTERNAL MEDICINE

## 2023-09-27 PROCEDURE — 90694 FLU VACCINE - QUADRIVALENT - ADJUVANTED: ICD-10-PCS | Mod: ,,, | Performed by: INTERNAL MEDICINE

## 2023-09-27 PROCEDURE — 99214 OFFICE O/P EST MOD 30 MIN: CPT | Mod: ,,, | Performed by: NURSE PRACTITIONER

## 2023-09-27 RX ORDER — MIRABEGRON 25 MG/1
25 TABLET, FILM COATED, EXTENDED RELEASE ORAL DAILY
Qty: 30 TABLET | Refills: 11 | Status: SHIPPED | OUTPATIENT
Start: 2023-09-27 | End: 2024-09-26

## 2023-09-27 NOTE — PROGRESS NOTES
"Subjective:      Patient ID: Renata Melchor is a 83 y.o. female.    Chief Complaint: Follow-up      HPI: Patient here today for 6 month follow up. She reports she will need some documents completed for extended hours of care for long term care. She reports exertional SOB under care of CV. Denies depression or need for meds at this time. Continued issues with urinary frequency.  No dysuria.  She reports concerns of memory issues over the last 6 months which have been gradual.    Review of patient's allergies indicates:   Allergen Reactions    Cephalexin      Other reaction(s): Rash    Codeine      Other reaction(s): N/V    Gabapentin      Other reaction(s): does not recall reaction    Levofloxacin      Other reaction(s): Refused    Oxycodone-acetaminophen     Penicillin      Other reaction(s): extreme drop in BP, shock    Soy Itching    Sulfamethoxazole      Other reaction(s): rash    Tramadol      Other reaction(s): itching       Review of Systems  Constitutional: No fever, No chills, No sweats, fatigue, No weight loss.  Eyes: No blurring.  Ear/Nose/Mouth/Throat: No nasal congestion, No vertigo.  Respiratory: No shortness of breath, No cough, No sputum production, No wheezing, No exertional dyspnea.   Cardiovascular: No chest pain, No palpitations, No claudication, No orthopnea, No peripheral edema.  Gastrointestinal: No nausea, No vomiting, No diarrhea, No rectal bleeding, No constipation, No abdominal pain.  Genitourinary: No dysuria, No hematuria, No frequency.  Endocrine: No excessive thirst, No polyuria, No cold intolerance, No heat intolerance.  Musculoskeletal: No joint pain, No muscle pain.  Integumentary: No rash, No ecchymosis.  Neurologic: No altered mental status, No headaches.  Psychiatrics: No anxiety, depression, No SI/HI.  Objective:   Visit Vitals  /71 (BP Location: Right arm, Patient Position: Sitting, BP Method: Large (Manual))   Pulse 101   Resp 18   Ht 5' 3" (1.6 m)   Wt 94.3 kg (208 lb) "   SpO2 95%   BMI 36.85 kg/m²     The patient's weight trend is below:   Wt Readings from Last 4 Encounters:   09/27/23 94.3 kg (208 lb)   03/29/23 96.6 kg (213 lb)   09/28/22 92.9 kg (204 lb 12.8 oz)   01/04/22 93 kg (205 lb 0.4 oz)        Physical Exam  Vitals and nursing note reviewed.   Constitutional:       General: She is not in acute distress.     Appearance: Normal appearance. She is normal weight. She is not ill-appearing, toxic-appearing or diaphoretic.   HENT:      Head: Normocephalic and atraumatic.   Cardiovascular:      Rate and Rhythm: Normal rate and regular rhythm.      Heart sounds: Murmur heard.   Pulmonary:      Effort: Pulmonary effort is normal. No respiratory distress.      Breath sounds: Normal breath sounds. No stridor. No wheezing or rhonchi.   Abdominal:      General: Abdomen is flat.   Neurological:      General: No focal deficit present.      Mental Status: She is alert and oriented to person, place, and time. Mental status is at baseline.         Assessment/Plan:   1. Urinary frequency  Trial of Myrbetriq 25mg  Advised of possible s/e, benefits, and purpose of med    - mirabegron (MYRBETRIQ) 25 mg Tb24 ER tablet; Take 1 tablet (25 mg total) by mouth once daily.  Dispense: 30 tablet; Refill: 11    2. Fatigue, unspecified type  Suspect related to multiple co-morbidities  Reviewed current labs with patient and stable  Continue to monitor    3. Exertional shortness of breath  Stable and chronic for her    4. Primary hypertension  HYPERTENSION RECOMMENDATIONS:  Continue current treatment regimen.  Dietary sodium restriction.  Regular aerobic exercise.  Reduce stress.      5. Frequent falls  Rec continued, cautious mobility with walker at all times    6. Memory change  Offered MMSE which she declined at this time  Continue to monitor    7. Severe obesity (BMI 35.0-39.9) with comorbidity  HEALTH EDUCATION RECOMMENDATIONS:  weight control, diet and cholesterol, exercise 150 minutes per week,  stress reduction, and adequate sleep 6-8 hours per night      8. Bilateral carotid artery stenosis  The patient is stable on the current regimen.    Continue current medications for this problem.    Follow up with specialist that is also caring for this problem.      Medication List with Changes/Refills   New Medications    MIRABEGRON (MYRBETRIQ) 25 MG TB24 ER TABLET    Take 1 tablet (25 mg total) by mouth once daily.   Current Medications    ACETAMINOPHEN (TYLENOL) 500 MG TABLET    Take by mouth every 6 (six) hours as needed.    DILTIAZEM (CARDIZEM CD) 120 MG CP24    Take 1 capsule (120 mg total) by mouth 2 (two) times a day.    KETOCONAZOLE (NIZORAL) 2 % SHAMPOO    Apply topically twice a week.    MELOXICAM (MOBIC) 15 MG TABLET    Take 1 tablet (15 mg total) by mouth once daily.    METAXALONE (SKELAXIN) 800 MG TABLET    Take 800 mg by mouth every 8 (eight) hours as needed.    SPIRONOLACTONE (ALDACTONE) 100 MG TABLET    Take 1 tablet (100 mg total) by mouth once daily.        Follow up in about 6 months (around 3/27/2024) for Wellness Recheck, with Dr. Kidd.    Chemistry:  Lab Results   Component Value Date     09/26/2023    K 5.0 09/26/2023    CHLORIDE 105 09/26/2023    BUN 20.4 (H) 09/26/2023    CREATININE 0.88 09/26/2023    EGFRNORACEVR >60 09/26/2023    GLUCOSE 120 (H) 09/26/2023    CALCIUM 9.8 09/26/2023    ALKPHOS 62 09/26/2023    LABPROT 7.2 09/26/2023    ALBUMIN 3.9 09/26/2023    BILIDIR 0.3 03/10/2021    IBILI 0.40 03/10/2021    AST 17 09/26/2023    ALT 25 09/26/2023    ERHSGASH14VA 65.4 03/10/2021        Lab Results   Component Value Date    HGBA1C 5.8 04/12/2023        Hematology:  Lab Results   Component Value Date    WBC 9.10 09/26/2023    HGB 15.6 09/26/2023    HCT 45.8 09/26/2023     09/26/2023       Lipid Panel:  Lab Results   Component Value Date    CHOL 198 09/26/2023    HDL 48 09/26/2023    .00 09/26/2023    TRIG 112 09/26/2023    TOTALCHOLEST 4 09/26/2023         Urine:  Lab Results   Component Value Date    COLORUA Yellow 04/12/2023    APPEARANCEUA Hazy (A) 04/12/2023    SGUA 1.025 04/12/2023    PHUA 6.0 04/12/2023    PROTEINUA Negative 04/12/2023    GLUCOSEUA Negative 04/12/2023    KETONESUA Negative 04/12/2023    BLOODUA Negative 04/12/2023    NITRITESUA Positive (A) 04/12/2023    LEUKOCYTESUR Small (A) 04/12/2023    RBCUA 0-2 04/12/2023    WBCUA 6-10 (A) 04/12/2023    BACTERIA Moderate (A) 04/12/2023

## 2023-10-08 PROCEDURE — G0179 PR HOME HEALTH MD RECERTIFICATION: ICD-10-PCS | Mod: ,,, | Performed by: INTERNAL MEDICINE

## 2023-10-08 PROCEDURE — G0179 MD RECERTIFICATION HHA PT: HCPCS | Mod: ,,, | Performed by: INTERNAL MEDICINE

## 2023-10-17 DIAGNOSIS — R60.9 SWELLING: ICD-10-CM

## 2023-10-17 RX ORDER — SPIRONOLACTONE 100 MG/1
100 TABLET, FILM COATED ORAL
Qty: 30 TABLET | Refills: 5 | Status: SHIPPED | OUTPATIENT
Start: 2023-10-17

## 2023-10-24 ENCOUNTER — EXTERNAL HOME HEALTH (OUTPATIENT)
Dept: HOME HEALTH SERVICES | Facility: HOSPITAL | Age: 83
End: 2023-10-24
Payer: MEDICARE

## 2023-11-14 ENCOUNTER — TELEPHONE (OUTPATIENT)
Dept: INTERNAL MEDICINE | Facility: CLINIC | Age: 83
End: 2023-11-14
Payer: MEDICARE

## 2023-11-14 DIAGNOSIS — R30.0 DYSURIA: Primary | ICD-10-CM

## 2023-11-14 NOTE — TELEPHONE ENCOUNTER
Per verbal standing order, UA with reflex to culture ordered and faxed to [facility].    Please advise on antifungal irritation.

## 2023-11-14 NOTE — TELEPHONE ENCOUNTER
----- Message from Evelyn Mccray sent at 11/14/2023 12:50 PM CST -----  .Type:  Patient Returning Call    Who Called:Azeb from [facility]   Who Left Message for Patient:  Does the patient know what this is regarding?:collecting a urine   Would the patient rather a call back or a response via [facility]? Call back   Best Call Back Number:[phone]  Additional Information: Staff states that she will collect a urine sample on the pt while she is in the home. Staff needs orders for a UA and antifungal irritation

## 2023-11-15 ENCOUNTER — LAB REQUISITION (OUTPATIENT)
Dept: LAB | Facility: HOSPITAL | Age: 83
End: 2023-11-15
Payer: MEDICARE

## 2023-11-15 DIAGNOSIS — N39.0 URINARY TRACT INFECTION, SITE NOT SPECIFIED: ICD-10-CM

## 2023-11-15 LAB
APPEARANCE UR: ABNORMAL
BACTERIA #/AREA URNS AUTO: ABNORMAL /HPF
BILIRUB UR QL STRIP.AUTO: NEGATIVE
COLOR UR AUTO: ABNORMAL
GLUCOSE UR QL STRIP.AUTO: NEGATIVE
KETONES UR QL STRIP.AUTO: NEGATIVE
LEUKOCYTE ESTERASE UR QL STRIP.AUTO: ABNORMAL
NITRITE UR QL STRIP.AUTO: POSITIVE
PH UR STRIP.AUTO: 6 [PH]
PROT UR QL STRIP.AUTO: NEGATIVE
RBC #/AREA URNS AUTO: ABNORMAL /HPF
RBC UR QL AUTO: NEGATIVE
SP GR UR STRIP.AUTO: 1.02 (ref 1–1.03)
SQUAMOUS #/AREA URNS AUTO: ABNORMAL /HPF
UROBILINOGEN UR STRIP-ACNC: 0.2
WBC #/AREA URNS AUTO: ABNORMAL /HPF

## 2023-11-15 PROCEDURE — 81001 URINALYSIS AUTO W/SCOPE: CPT | Performed by: INTERNAL MEDICINE

## 2023-11-15 PROCEDURE — 87186 SC STD MICRODIL/AGAR DIL: CPT | Performed by: INTERNAL MEDICINE

## 2023-11-15 PROCEDURE — 87086 URINE CULTURE/COLONY COUNT: CPT | Performed by: INTERNAL MEDICINE

## 2023-11-17 ENCOUNTER — TELEPHONE (OUTPATIENT)
Dept: INTERNAL MEDICINE | Facility: CLINIC | Age: 83
End: 2023-11-17
Payer: MEDICARE

## 2023-11-17 DIAGNOSIS — N39.0 URINARY TRACT INFECTION WITHOUT HEMATURIA, SITE UNSPECIFIED: Primary | ICD-10-CM

## 2023-11-17 LAB — BACTERIA UR CULT: ABNORMAL

## 2023-11-17 RX ORDER — DOXYCYCLINE 100 MG/1
100 CAPSULE ORAL 2 TIMES DAILY
Qty: 14 CAPSULE | Refills: 0 | Status: SHIPPED | OUTPATIENT
Start: 2023-11-17 | End: 2023-11-24

## 2023-11-17 NOTE — TELEPHONE ENCOUNTER
----- Message from Ana Ni sent at 11/17/2023 10:43 AM CST -----  Regarding: advice  Type:  Needs Medical Advice    Who Called: [facility]    Pharmacy name and phone #:    Would the patient rather a call back or a response via [facility]? C/b  Best Call Back Number: [phone]    Additional Information: urine test shows bacteria

## 2023-11-22 ENCOUNTER — TELEPHONE (OUTPATIENT)
Dept: INTERNAL MEDICINE | Facility: CLINIC | Age: 83
End: 2023-11-22
Payer: MEDICARE

## 2023-11-22 DIAGNOSIS — N89.8 VAGINAL ITCHING: Primary | ICD-10-CM

## 2023-11-22 RX ORDER — FLUCONAZOLE 100 MG/1
100 TABLET ORAL DAILY
Qty: 5 TABLET | Refills: 0 | Status: SHIPPED | OUTPATIENT
Start: 2023-11-22 | End: 2023-11-27

## 2023-11-28 ENCOUNTER — TELEPHONE (OUTPATIENT)
Dept: INTERNAL MEDICINE | Facility: CLINIC | Age: 83
End: 2023-11-28
Payer: MEDICARE

## 2023-11-28 NOTE — TELEPHONE ENCOUNTER
----- Message from Eulalio Frey sent at 11/28/2023 12:49 PM CST -----  .Type:  Needs Medical Advice    Who Called: Kerry [facility]  [facility]   Symptoms (please be specific):    How long has patient had these symptoms:    Pharmacy name and phone #:    Would the patient rather a call back or a response via [facility]?   Best Call Back Number: [phone]  Additional Information: Requested a call back re: groin is still burning,

## 2023-11-28 NOTE — TELEPHONE ENCOUNTER
----- Message from Eulalio Frey sent at 11/28/2023 12:49 PM CST -----  .Type:  Needs Medical Advice    Who Called: Kerry [facility]TO  [facility]   Symptoms (please be specific):    How long has patient had these symptoms:    Pharmacy name and phone #:    Would the patient rather a call back or a response via [facility]?   Best Call Back Number: [phone]  Additional Information: Requested a call back re: groin is still burning,

## 2023-11-30 ENCOUNTER — TELEPHONE (OUTPATIENT)
Dept: INTERNAL MEDICINE | Facility: CLINIC | Age: 83
End: 2023-11-30
Payer: MEDICARE

## 2023-11-30 NOTE — TELEPHONE ENCOUNTER
----- Message from Ana Ni sent at 11/30/2023 12:51 PM CST -----  Regarding: call back wanted  Type:  Needs Medical Advice    Who Called: pt  Would the patient rather a call back or a response via [facility]? pt  Best Call Back Number: [phone]    Additional Information: would not give any info wants clinic to call back

## 2023-12-07 PROCEDURE — G0179 MD RECERTIFICATION HHA PT: HCPCS | Mod: ,,, | Performed by: INTERNAL MEDICINE

## 2024-01-09 ENCOUNTER — EXTERNAL HOME HEALTH (OUTPATIENT)
Dept: HOME HEALTH SERVICES | Facility: HOSPITAL | Age: 84
End: 2024-01-09
Payer: MEDICARE

## 2024-01-24 NOTE — TELEPHONE ENCOUNTER
----- Message from Carlos Kidd MD sent at 11/17/2023 11:00 AM CST -----  Regarding: RE: advice  Please send prescription for Vibramycin 100 mg, 14., 1 p.o. b.i.d. until gone.  ----- Message -----  From: Leticia Hill LPN  Sent: 11/17/2023  10:50 AM CST  To: Carlos Kidd MD  Subject: FW: advice                                       A UA was collected on patient due to vaginal irritation. Please advise on results.  ----- Message -----  From: Ana Ni  Sent: 11/17/2023  10:46 AM CST  To: Bernabe JOSEPH Staff  Subject: advice                                           Type:  Needs Medical Advice    Who Called: [facility]    Pharmacy name and phone #:    Would the patient rather a call back or a response via [facility]? C/b  Best Call Back Number: [phone]    Additional Information: urine test shows bacteria            normal gait and station

## 2024-02-05 PROCEDURE — G0179 MD RECERTIFICATION HHA PT: HCPCS | Mod: ,,, | Performed by: INTERNAL MEDICINE

## 2024-02-14 ENCOUNTER — TELEPHONE (OUTPATIENT)
Dept: INTERNAL MEDICINE | Facility: CLINIC | Age: 84
End: 2024-02-14
Payer: MEDICARE

## 2024-02-14 ENCOUNTER — EXTERNAL HOME HEALTH (OUTPATIENT)
Dept: HOME HEALTH SERVICES | Facility: HOSPITAL | Age: 84
End: 2024-02-14
Payer: MEDICARE

## 2024-02-14 NOTE — TELEPHONE ENCOUNTER
----- Message from Myriam Diggs sent at 2/14/2024  8:15 AM CST -----  Regarding: returning call  .Type:  Patient Returning Call    Who Called:pt  Who Left Message for Patient:  Does the patient know what this is regarding?:  Would the patient rather a call back or a response via [facility]? Call back   Best Call Back Number:[phone]  Additional Information: pt returning call

## 2024-02-21 ENCOUNTER — TELEPHONE (OUTPATIENT)
Dept: INTERNAL MEDICINE | Facility: CLINIC | Age: 84
End: 2024-02-21
Payer: MEDICARE

## 2024-02-21 DIAGNOSIS — I10 HYPERTENSION, UNSPECIFIED TYPE: ICD-10-CM

## 2024-02-21 DIAGNOSIS — Z79.899 ENCOUNTER FOR LONG-TERM (CURRENT) USE OF OTHER MEDICATIONS: ICD-10-CM

## 2024-02-21 DIAGNOSIS — R30.0 DYSURIA: Primary | ICD-10-CM

## 2024-02-21 DIAGNOSIS — R53.83 FATIGUE, UNSPECIFIED TYPE: ICD-10-CM

## 2024-02-21 NOTE — TELEPHONE ENCOUNTER
----- Message from Slava Barr sent at 2/21/2024 12:44 PM CST -----  .Type:  Needs Medical Advice    Who Called: [facility]   Symptoms (please be specific):    How long has patient had these symptoms:    Pharmacy name and phone #:    Would the patient rather a call back or a response via [facility]? Call back  Best Call Back Number: [phone]  Additional Information: calling to discuss if lab orders are needed for upcoming appt

## 2024-02-21 NOTE — TELEPHONE ENCOUNTER
Last Lab Draw was on 9-26-23 CBC, CMP, Lipid, TSH and 4-12-23 an AIC.  Patient scheduled for an appt on 3-28-24.  Please advise.

## 2024-03-12 LAB
CHOLEST SERPL-MSCNC: 116 MG/DL (ref 0–200)
HBA1C MFR BLD: 6.3 % (ref 4–6)
HDLC SERPL-MCNC: 59 MG/DL (ref 35–70)
LDLC SERPL CALC-MCNC: 39 MG/DL (ref 0–160)
TRIGL SERPL-MCNC: 94 MG/DL (ref 40–160)

## 2024-03-13 ENCOUNTER — DOCUMENTATION ONLY (OUTPATIENT)
Dept: INTERNAL MEDICINE | Facility: CLINIC | Age: 84
End: 2024-03-13
Payer: MEDICARE

## 2024-03-19 ENCOUNTER — LAB REQUISITION (OUTPATIENT)
Dept: LAB | Facility: HOSPITAL | Age: 84
End: 2024-03-19
Payer: MEDICARE

## 2024-03-19 DIAGNOSIS — N32.81 OVERACTIVE BLADDER: ICD-10-CM

## 2024-03-19 LAB
APPEARANCE UR: CLEAR
BACTERIA #/AREA URNS AUTO: ABNORMAL /HPF
BILIRUB UR QL STRIP.AUTO: NEGATIVE
COLOR UR AUTO: ABNORMAL
GLUCOSE UR QL STRIP.AUTO: NEGATIVE
KETONES UR QL STRIP.AUTO: NEGATIVE
LEUKOCYTE ESTERASE UR QL STRIP.AUTO: NEGATIVE
NITRITE UR QL STRIP.AUTO: POSITIVE
PH UR STRIP.AUTO: 6 [PH]
PROT UR QL STRIP.AUTO: NEGATIVE
RBC #/AREA URNS AUTO: ABNORMAL /HPF
RBC UR QL AUTO: NEGATIVE
SP GR UR STRIP.AUTO: 1.02 (ref 1–1.03)
SQUAMOUS #/AREA URNS AUTO: ABNORMAL /HPF
UROBILINOGEN UR STRIP-ACNC: 0.2
WBC #/AREA URNS AUTO: ABNORMAL /HPF

## 2024-03-19 PROCEDURE — 87086 URINE CULTURE/COLONY COUNT: CPT | Performed by: INTERNAL MEDICINE

## 2024-03-19 PROCEDURE — 81003 URINALYSIS AUTO W/O SCOPE: CPT | Performed by: INTERNAL MEDICINE

## 2024-03-20 ENCOUNTER — TELEPHONE (OUTPATIENT)
Dept: INTERNAL MEDICINE | Facility: CLINIC | Age: 84
End: 2024-03-20
Payer: MEDICARE

## 2024-03-20 DIAGNOSIS — R06.02 EXERTIONAL SHORTNESS OF BREATH: Primary | ICD-10-CM

## 2024-03-20 DIAGNOSIS — R26.89 DECREASED MOBILITY: ICD-10-CM

## 2024-03-20 DIAGNOSIS — R29.898 WEAKNESS OF LOWER EXTREMITY, UNSPECIFIED LATERALITY: ICD-10-CM

## 2024-03-20 NOTE — TELEPHONE ENCOUNTER
----- Message from Ana Ni sent at 3/20/2024 11:05 AM CDT -----  Regarding: referral  for PT  Type:  Patient Requesting Referral    Who Called:shanon @ [facility]    Referral to What Specialty:physical therapy  Reason for Referral:weakness & loss of mobility  Does the patient want the referral with a specific physician?:[facility]    Best Call Back Number:[phone]   fax# [phone]    Additional Information: please send script for PT to [facility]

## 2024-03-20 NOTE — TELEPHONE ENCOUNTER
----- Message from Eulalio Frey sent at 3/20/2024 12:43 PM CDT -----  .Type:  Needs Medical Advice    Who Called: [facility].   Symptoms (margaux be specific):    How long has patient had these symptoms:    Pharmacy name and phone #:    Would the patient rather a call back or a response via [facility]?   Best Call Back Number: [phone]   Additional Information: Requested to speak with the nurse about this patient.  Please give her a call back.

## 2024-03-21 ENCOUNTER — TELEPHONE (OUTPATIENT)
Dept: INTERNAL MEDICINE | Facility: CLINIC | Age: 84
End: 2024-03-21
Payer: MEDICARE

## 2024-03-21 DIAGNOSIS — N39.0 URINARY TRACT INFECTION WITHOUT HEMATURIA, SITE UNSPECIFIED: ICD-10-CM

## 2024-03-21 DIAGNOSIS — N39.0 URINARY TRACT INFECTION WITHOUT HEMATURIA, SITE UNSPECIFIED: Primary | ICD-10-CM

## 2024-03-21 RX ORDER — DOXYCYCLINE 100 MG/1
100 CAPSULE ORAL 2 TIMES DAILY
Qty: 14 CAPSULE | Refills: 0 | Status: SHIPPED | OUTPATIENT
Start: 2024-03-21 | End: 2024-03-21 | Stop reason: SDUPTHER

## 2024-03-21 RX ORDER — DOXYCYCLINE 100 MG/1
100 CAPSULE ORAL 2 TIMES DAILY
Qty: 14 CAPSULE | Refills: 0 | Status: SHIPPED | OUTPATIENT
Start: 2024-03-21 | End: 2024-03-28

## 2024-03-21 NOTE — TELEPHONE ENCOUNTER
----- Message from Sangeeta Block sent at 3/21/2024 10:45 AM CDT -----  Type:  Needs Medical Advice    Who Called: nati at  [facility]     Best Call Back Number: [phone]  Additional Information: stated she is returning a missed call from nurse

## 2024-03-21 NOTE — TELEPHONE ENCOUNTER
----- Message from Leticia Hill LPN sent at 3/20/2024 11:42 AM CDT -----  Regarding: felice Ortiz 3/28 @1:40  Fasting labs needed.

## 2024-03-21 NOTE — TELEPHONE ENCOUNTER
----- Message from Evelyn Mccray sent at 3/21/2024  1:03 PM CDT -----  .Type:  Patient Returning Call    Who Called:pt   Who Left Message for Patient:  Does the patient know what this is regarding?:medication at the pharmacy is wrong. Pt states that she gets her medication at [facility]. Not the hospital and needs it to be addressed STAT   Would the patient rather a call back or a response via [facility]? Call back   Best Call Back Number:[phone]  Additional Information:

## 2024-03-22 LAB
BACTERIA UR CULT: ABNORMAL
BACTERIA UR CULT: ABNORMAL

## 2024-03-26 ENCOUNTER — OFFICE VISIT (OUTPATIENT)
Dept: INTERNAL MEDICINE | Facility: CLINIC | Age: 84
End: 2024-03-26
Payer: MEDICARE

## 2024-03-26 ENCOUNTER — TELEPHONE (OUTPATIENT)
Dept: INTERNAL MEDICINE | Facility: CLINIC | Age: 84
End: 2024-03-26
Payer: MEDICARE

## 2024-03-26 VITALS
HEART RATE: 91 BPM | WEIGHT: 208.19 LBS | HEIGHT: 63 IN | OXYGEN SATURATION: 95 % | SYSTOLIC BLOOD PRESSURE: 120 MMHG | BODY MASS INDEX: 36.89 KG/M2 | DIASTOLIC BLOOD PRESSURE: 78 MMHG | RESPIRATION RATE: 18 BRPM

## 2024-03-26 DIAGNOSIS — L98.499 SKIN ULCER, UNSPECIFIED ULCER STAGE: Primary | ICD-10-CM

## 2024-03-26 DIAGNOSIS — I10 HYPERTENSION, UNSPECIFIED TYPE: ICD-10-CM

## 2024-03-26 PROCEDURE — 99214 OFFICE O/P EST MOD 30 MIN: CPT | Mod: ,,, | Performed by: INTERNAL MEDICINE

## 2024-03-26 RX ORDER — CIPROFLOXACIN 500 MG/1
500 TABLET ORAL 2 TIMES DAILY
Qty: 14 TABLET | Refills: 0 | Status: SHIPPED | OUTPATIENT
Start: 2024-03-26

## 2024-03-26 RX ORDER — MUPIROCIN 20 MG/G
OINTMENT TOPICAL 3 TIMES DAILY
COMMUNITY
Start: 2024-03-22

## 2024-03-26 RX ORDER — CEPHALEXIN 500 MG/1
500 CAPSULE ORAL EVERY 12 HOURS
COMMUNITY
Start: 2024-03-22 | End: 2024-03-26

## 2024-03-26 NOTE — TELEPHONE ENCOUNTER
----- Message from Leticia Hill LPN sent at 3/26/2024 11:19 AM CDT -----  Regarding: felice Wong 4/3 @1:00  Fasting labs needed.

## 2024-03-26 NOTE — PROGRESS NOTES
Subjective:       Patient ID: Renata Melchor is a 83 y.o. female.    Chief Complaint: Puncture Wound (Wound on lower abdominal area.)      The patient is an 83-year-old woman in for evaluation of an ulcer an area of infection.  This began about a week ago as they area of redness and a small blister.  [facility].  She was put on Keflex although she reportedly is allergic to it but she had no reaction.  She was also put on Bactroban ointment.  It looks like the area in questions about the same based on the photographs taken in the emergency room.  No fever chills.  No new complaints.      Review of Systems     Current Outpatient Medications on File Prior to Visit   Medication Sig Dispense Refill    acetaminophen (TYLENOL) 500 MG tablet Take by mouth every 6 (six) hours as needed.      diltiaZEM (CARDIZEM CD) 120 MG Cp24 Take 1 capsule (120 mg total) by mouth 2 (two) times a day. 60 capsule PRN    doxycycline (VIBRAMYCIN) 100 MG Cap Take 1 capsule (100 mg total) by mouth 2 (two) times a day. for 7 days 14 capsule 0    ketoconazole (NIZORAL) 2 % shampoo Apply topically twice a week. 120 mL 11    meloxicam (MOBIC) 15 MG tablet Take 1 tablet (15 mg total) by mouth once daily. 30 tablet 0    metaxalone (SKELAXIN) 800 MG tablet Take 800 mg by mouth every 8 (eight) hours as needed.      mirabegron (MYRBETRIQ) 25 mg Tb24 ER tablet Take 1 tablet (25 mg total) by mouth once daily. 30 tablet 11    mupirocin (BACTROBAN) 2 % ointment Apply topically 3 (three) times daily.      spironolactone (ALDACTONE) 100 MG tablet take 1 tablet BY MOUTH ONCE DAILY 30 tablet 5    [DISCONTINUED] cephALEXin (KEFLEX) 500 MG capsule Take 500 mg by mouth every 12 (twelve) hours.       No current facility-administered medications on file prior to visit.     Objective:      /78 (BP Location: Right arm, Patient Position: Sitting, BP Method: Large (Manual))   Pulse 91   Resp 18   Ht 5'  "3" (1.6 m)   Wt 94.4 kg (208 lb 3.2 oz)   SpO2 95%   BMI 36.88 kg/m²     Physical Exam  Vitals reviewed.   Constitutional:       Appearance: Normal appearance.   HENT:      Head: Normocephalic.      Nose: Nose normal.      Mouth/Throat:      Pharynx: Oropharynx is clear.   Eyes:      Pupils: Pupils are equal, round, and reactive to light.   Neck:      Thyroid: No thyromegaly.   Cardiovascular:      Rate and Rhythm: Normal rate and regular rhythm.      Pulses: Normal pulses.   Abdominal:      General: Abdomen is flat. Bowel sounds are normal.      Palpations: Abdomen is soft. There is no hepatomegaly, splenomegaly or mass.      Tenderness: There is no abdominal tenderness.      Comments: There is a 3 x 5 mm ulcer right lower quadrant.  It was fairly deep with a white base.  Mild surrounding redness of about 2 cm.  Nontender.   Musculoskeletal:      Cervical back: Neck supple.      Comments: 1+ bilateral pretibial edema   Lymphadenopathy:      Cervical: No cervical adenopathy.   Skin:     General: Skin is warm and dry.   Neurological:      Mental Status: She is alert.       Hospital records reviewed.  White count was mildly elevated.  Assessment:       1. Skin ulcer right abdominal wall.  Sounds like she had some surrounding inflammatory changes unlikely cellulitic.  Given the recent blister formation consider staff.  Slowly improving with Keflex    2. Hypertension.  Controlled    3. Stable other medical problems.    Plan:     Discontinue Keflex it add Cipro 500 b.i.d. x7 days.  Recommended usually a Q-tip to clean the base of the ulcer once or twice a day, then cover with Vaseline and then cover that with a Band-Aid.  Follow-up with me as scheduled next week            "

## 2024-04-03 ENCOUNTER — TELEPHONE (OUTPATIENT)
Dept: INTERNAL MEDICINE | Facility: CLINIC | Age: 84
End: 2024-04-03
Payer: MEDICARE

## 2024-04-03 NOTE — TELEPHONE ENCOUNTER
----- Message from Leticia Hill LPN sent at 4/3/2024  7:51 AM CDT -----  Regarding: felice Wong 04/10 @2:20  Fasting labs needed.

## 2024-04-05 PROCEDURE — G0179 MD RECERTIFICATION HHA PT: HCPCS | Mod: ,,, | Performed by: INTERNAL MEDICINE

## 2024-04-09 ENCOUNTER — LAB REQUISITION (OUTPATIENT)
Dept: LAB | Facility: HOSPITAL | Age: 84
End: 2024-04-09
Payer: MEDICARE

## 2024-04-09 ENCOUNTER — TELEPHONE (OUTPATIENT)
Dept: INTERNAL MEDICINE | Facility: CLINIC | Age: 84
End: 2024-04-09
Payer: MEDICARE

## 2024-04-09 DIAGNOSIS — N32.81 OVERACTIVE BLADDER: ICD-10-CM

## 2024-04-09 LAB
APPEARANCE UR: ABNORMAL
BILIRUB UR QL STRIP.AUTO: NEGATIVE
COLOR UR AUTO: ABNORMAL
GLUCOSE UR QL STRIP.AUTO: NEGATIVE
KETONES UR QL STRIP.AUTO: NEGATIVE
LEUKOCYTE ESTERASE UR QL STRIP.AUTO: NEGATIVE
NITRITE UR QL STRIP.AUTO: NEGATIVE
PH UR STRIP.AUTO: 6 [PH]
PROT UR QL STRIP.AUTO: NEGATIVE
RBC UR QL AUTO: NEGATIVE
SP GR UR STRIP.AUTO: 1.02 (ref 1–1.03)
UROBILINOGEN UR STRIP-ACNC: 0.2

## 2024-04-09 PROCEDURE — 87086 URINE CULTURE/COLONY COUNT: CPT | Performed by: INTERNAL MEDICINE

## 2024-04-09 PROCEDURE — 81003 URINALYSIS AUTO W/O SCOPE: CPT | Performed by: INTERNAL MEDICINE

## 2024-04-09 NOTE — TELEPHONE ENCOUNTER
Jonathan with [facility] called stated patient c/o dysuria and urgency.  Jonathan requested UA order.  Per verbal standing order a UA with Reflex to Culture Okay given to Jonathan.

## 2024-04-10 ENCOUNTER — OFFICE VISIT (OUTPATIENT)
Dept: INTERNAL MEDICINE | Facility: CLINIC | Age: 84
End: 2024-04-10
Payer: MEDICARE

## 2024-04-10 VITALS
DIASTOLIC BLOOD PRESSURE: 61 MMHG | HEART RATE: 94 BPM | HEIGHT: 63 IN | SYSTOLIC BLOOD PRESSURE: 115 MMHG | RESPIRATION RATE: 18 BRPM | BODY MASS INDEX: 37.39 KG/M2 | OXYGEN SATURATION: 95 % | WEIGHT: 211 LBS

## 2024-04-10 DIAGNOSIS — N39.0 URINARY TRACT INFECTION WITHOUT HEMATURIA, SITE UNSPECIFIED: ICD-10-CM

## 2024-04-10 DIAGNOSIS — E66.01 SEVERE OBESITY (BMI 35.0-39.9) WITH COMORBIDITY: ICD-10-CM

## 2024-04-10 DIAGNOSIS — I10 HYPERTENSION, UNSPECIFIED TYPE: ICD-10-CM

## 2024-04-10 DIAGNOSIS — R73.9 HYPERGLYCEMIA: ICD-10-CM

## 2024-04-10 DIAGNOSIS — Z00.00 WELLNESS EXAMINATION: Primary | ICD-10-CM

## 2024-04-10 DIAGNOSIS — L98.499 SKIN ULCER, UNSPECIFIED ULCER STAGE: ICD-10-CM

## 2024-04-10 PROCEDURE — G0439 PPPS, SUBSEQ VISIT: HCPCS | Mod: ,,, | Performed by: INTERNAL MEDICINE

## 2024-04-10 NOTE — PROGRESS NOTES
Subjective:      Patient Care Team:  Carlos Kidd MD as PCP - General (Internal Medicine)  Carlos Kidd MD LaGraize, Christopher E, MD as Consulting Physician (Vascular Surgery)      Patient ID: Renata Melchor is a 83 y.o. female.    Chief Complaint: Medicare AWV      Is an 83-year-old woman in for wellness check and follow-up multiple medical problems and also complaints of a recent cough.  Both her  had respiratory tract symptoms.  She was had a croupy type cough over the past week or 2.  Productive of clear phlegm only.  No fever chills.        Review of Systems   All other systems reviewed and are negative.       Current Outpatient Medications on File Prior to Visit   Medication Sig Dispense Refill    acetaminophen (TYLENOL) 500 MG tablet Take by mouth every 6 (six) hours as needed.      ciprofloxacin HCl (CIPRO) 500 MG tablet Take 1 tablet (500 mg total) by mouth 2 (two) times daily. 14 tablet 0    diltiaZEM (CARDIZEM CD) 120 MG Cp24 Take 1 capsule (120 mg total) by mouth 2 (two) times a day. 60 capsule PRN    ketoconazole (NIZORAL) 2 % shampoo Apply topically twice a week. 120 mL 11    meloxicam (MOBIC) 15 MG tablet Take 1 tablet (15 mg total) by mouth once daily. 30 tablet 0    metaxalone (SKELAXIN) 800 MG tablet Take 800 mg by mouth every 8 (eight) hours as needed.      mirabegron (MYRBETRIQ) 25 mg Tb24 ER tablet Take 1 tablet (25 mg total) by mouth once daily. 30 tablet 11    mupirocin (BACTROBAN) 2 % ointment Apply topically 3 (three) times daily.      spironolactone (ALDACTONE) 100 MG tablet take 1 tablet BY MOUTH ONCE DAILY 30 tablet 5     No current facility-administered medications on file prior to visit.       Patient Reported Health Risk Assessment       Opioid Screening: Patient medication list reviewed, patient is not taking prescription opioids. Patient is not using additional opioids than prescribed. Patient is at low risk of substance abuse based on this opioid use  "history.       Objective:      /61 (BP Location: Left arm, Patient Position: Sitting, BP Method: Large (Manual))   Pulse 94   Resp 18   Ht 5' 3" (1.6 m)   Wt 95.7 kg (211 lb)   SpO2 95%   BMI 37.38 kg/m²     Physical Exam  Vitals reviewed.   Constitutional:       Appearance: Normal appearance.   HENT:      Head: Normocephalic.      Nose: Nose normal.      Mouth/Throat:      Pharynx: Oropharynx is clear.   Eyes:      Pupils: Pupils are equal, round, and reactive to light.   Neck:      Thyroid: No thyromegaly.   Cardiovascular:      Rate and Rhythm: Normal rate and regular rhythm.      Pulses: Normal pulses.   Abdominal:      General: Abdomen is flat. Bowel sounds are normal.      Palpations: Abdomen is soft. There is no hepatomegaly, splenomegaly or mass.      Tenderness: There is no abdominal tenderness.      Comments: The skin breakdown on the abdominal wall right lower quadrant has actually enlarged and now paroxysmally 1 x 2 cm.  Whitish exudate at the base.  Minimal redness surrounding.  Of note is the fact that she was obese in her underwear very tight over the area in question.   Musculoskeletal:      Cervical back: Neck supple.   Lymphadenopathy:      Cervical: No cervical adenopathy.   Skin:     General: Skin is warm and dry.   Neurological:      Mental Status: She is alert.       Lab work reviewed.           No data to display                  4/10/2024     2:20 PM 3/26/2024     2:00 PM 9/27/2023     2:40 PM 3/29/2023     1:40 PM 9/28/2022     2:00 PM   Fall Risk Assessment - Outpatient   Mobility Status Ambulatory Ambulatory Ambulatory Ambulatory w/ assistance Ambulatory w/ assistance   Number of falls 1 0 1 with injury 1 0   Identified as fall risk False False True True False                Assessment:       1. Wellness     2. Hypertension.  Good control on current meds    3. Hyperglycemia.  Extremely close to a diagnosis of diabetes.      4. Obesity.      5. History of urinary tract " infection.  Repeat UA negative and culture negative thus far     6. Skin ulceration right lower abdominal quadrant.  Not sure what initiated it but she does wear very tight underwear the put pressure on the skin    7. Recent URI.  Mild symptoms left.  Perhaps allergic in origin.  Plan:     Continue current meds.  Avoid all pressure on the skin of the abdominal wall, especially around the ulcerated area.  Okay for traveling wound care to assess.  Encouraged reduction of calories and especially carbohydrates in diet.    Follow-up with me 3 months with CBC CMP lipid TSH hemoglobin A1c prior          Medicare Annual Wellness and Personalized Prevention Plan:   Fall Risk + Home Safety + Hearing Impairment + Depression Screen + Cognitive Impairment Screen + Health Risk Assessment all reviewed.       Health Maintenance Topics with due status: Not Due       Topic Last Completion Date    Hemoglobin A1c (Prediabetes) 03/12/2024    Lipid Panel 03/12/2024      The patient's Health Maintenance was reviewed and the following appears to be due at this time:   Health Maintenance Due   Topic Date Due    TETANUS VACCINE  Never done    Aspirin/Antiplatelet Therapy  Never done    DEXA Scan  Never done    RSV Vaccine (Age 60+ and Pregnant patients) (1 - 1-dose 60+ series) Never done    COVID-19 Vaccine (4 - 2023-24 season) 09/01/2023         Advance Care Planning   I attest to discussing Advance Care Planning with patient and/or family member.  Education was provided including the importance of the Health Care Power of , Advance Directives, and/or [place] documentation.  The patient expressed understanding to the importance of this information and discussion.  Length of ACP conversation in minutes:          Follow up in about 3 months (around 7/10/2024). In addition to their scheduled follow up, the patient has also been instructed to follow up on as needed basis.

## 2024-04-11 LAB — BACTERIA UR CULT: NORMAL

## 2024-04-12 ENCOUNTER — TELEPHONE (OUTPATIENT)
Dept: INTERNAL MEDICINE | Facility: CLINIC | Age: 84
End: 2024-04-12
Payer: MEDICARE

## 2024-04-12 DIAGNOSIS — R60.9 SWELLING: ICD-10-CM

## 2024-04-12 NOTE — TELEPHONE ENCOUNTER
----- Message from Courtney Jones sent at 4/12/2024 11:29 AM CDT -----  .Type:  Needs Medical Advice    [facility]   Symptoms (please be specific):    How long has patient had these symptoms:    Pharmacy name and phone #:    Would the patient rather a call back or a response via [facility]? Call back   Best Call Back Number: [phone]  Additional Information: want to know if we received urine culture no growth per nurse

## 2024-04-15 RX ORDER — SPIRONOLACTONE 100 MG/1
100 TABLET, FILM COATED ORAL
Qty: 30 TABLET | Refills: 5 | Status: SHIPPED | OUTPATIENT
Start: 2024-04-15

## 2024-04-23 ENCOUNTER — EXTERNAL HOME HEALTH (OUTPATIENT)
Dept: HOME HEALTH SERVICES | Facility: HOSPITAL | Age: 84
End: 2024-04-23
Payer: MEDICARE

## 2024-05-10 ENCOUNTER — TELEPHONE (OUTPATIENT)
Dept: INTERNAL MEDICINE | Facility: CLINIC | Age: 84
End: 2024-05-10
Payer: MEDICARE

## 2024-05-10 DIAGNOSIS — B37.9 YEAST INFECTION: Primary | ICD-10-CM

## 2024-05-10 RX ORDER — FLUCONAZOLE 100 MG/1
100 TABLET ORAL DAILY
Qty: 7 TABLET | Refills: 0 | Status: SHIPPED | OUTPATIENT
Start: 2024-05-10 | End: 2024-06-09

## 2024-05-10 NOTE — TELEPHONE ENCOUNTER
Patient stated she just finished a round of ABX for UTI and now has a vaginal yeast infection.  Patient requesting Rx be sent to [facility].  Please advise.

## 2024-05-10 NOTE — TELEPHONE ENCOUNTER
----- Message from Eulalio Frey sent at 5/10/2024 10:09 AM CDT -----  .Type:  Needs Medical Advice    Who Called: Renata  Symptoms (please be specific):    How long has patient had these symptoms:    Pharmacy name and phone #:    Would the patient rather a call back or a response via [facility]?   Best Call Back Number: [phone]  Additional Information: Patient requested to speak with the nurse re: burning upon urination please call her before the office closed.

## 2024-05-14 ENCOUNTER — TELEPHONE (OUTPATIENT)
Dept: INTERNAL MEDICINE | Facility: CLINIC | Age: 84
End: 2024-05-14
Payer: MEDICARE

## 2024-05-14 NOTE — TELEPHONE ENCOUNTER
Spoke with Kerry PINTO, stated patient c/o left knee pain, unable to walk on leg, unable to straighten leg.  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  Please advise.

## 2024-05-14 NOTE — TELEPHONE ENCOUNTER
----- Message from Slava Barr sent at 5/14/2024 11:12 AM CDT -----  .Who Called: [facility]    Caller is requesting assistance/information from provider's office.    Symptoms (please be specific): left Knee   How long has patient had these symptoms:    List of preferred pharmacies on file (remove unneeded):       Preferred Method of Contact: Phone Call  Patient's Preferred Phone Number on File: [phone]  Best Call Back Number, if different:  Additional Information: called wanting to discuss left knee due to issues bending

## 2024-05-15 ENCOUNTER — TELEPHONE (OUTPATIENT)
Dept: INTERNAL MEDICINE | Facility: CLINIC | Age: 84
End: 2024-05-15
Payer: MEDICARE

## 2024-05-15 DIAGNOSIS — M25.569 KNEE PAIN, UNSPECIFIED CHRONICITY, UNSPECIFIED LATERALITY: Primary | ICD-10-CM

## 2024-05-15 NOTE — TELEPHONE ENCOUNTER
[facility] Sent fax stating patient is requesting a referral to Dr. Patton.  Referral and information faxed to Dr. Patton's office per request.

## 2024-05-15 NOTE — TELEPHONE ENCOUNTER
----- Message from Slava Barr sent at 5/15/2024  8:59 AM CDT -----  .Who Called: Renata Melchor    Caller is requesting assistance/information from provider's office.    Symptoms (please be specific): left Knee   How long has patient had these symptoms:    List of preferred pharmacies on file (remove unneeded):       Preferred Method of Contact: Phone Call  Patient's Preferred Phone Number on File: [phone]   Best Call Back Number, if different:  Additional Information: pt called wanting to speak with nurse to discuss getting an order for an xray of left knee

## 2024-06-14 DIAGNOSIS — I10 HYPERTENSION, UNSPECIFIED TYPE: ICD-10-CM

## 2024-06-17 RX ORDER — DILTIAZEM HYDROCHLORIDE 120 MG/1
CAPSULE, EXTENDED RELEASE ORAL
Qty: 60 CAPSULE | Status: SHIPPED | OUTPATIENT
Start: 2024-06-17

## 2024-06-18 ENCOUNTER — EXTERNAL HOME HEALTH (OUTPATIENT)
Dept: HOME HEALTH SERVICES | Facility: HOSPITAL | Age: 84
End: 2024-06-18
Payer: MEDICARE

## 2024-06-25 ENCOUNTER — DOCUMENT SCAN (OUTPATIENT)
Dept: HOME HEALTH SERVICES | Facility: HOSPITAL | Age: 84
End: 2024-06-25
Payer: MEDICARE

## 2024-07-02 ENCOUNTER — TELEPHONE (OUTPATIENT)
Dept: INTERNAL MEDICINE | Facility: CLINIC | Age: 84
End: 2024-07-02
Payer: MEDICARE

## 2024-07-02 DIAGNOSIS — R42 VERTIGO: Primary | ICD-10-CM

## 2024-07-02 RX ORDER — MECLIZINE HYDROCHLORIDE 25 MG/1
25 TABLET ORAL 2 TIMES DAILY
Qty: 20 TABLET | Refills: 0 | Status: SHIPPED | OUTPATIENT
Start: 2024-07-02

## 2024-07-16 ENCOUNTER — TELEPHONE (OUTPATIENT)
Dept: INTERNAL MEDICINE | Facility: CLINIC | Age: 84
End: 2024-07-16
Payer: MEDICARE

## 2024-07-16 NOTE — TELEPHONE ENCOUNTER
----- Message from Mariola Alfredo sent at 7/16/2024  9:02 AM CDT -----  Regarding: advice  Who Called: Renata Melchor    Caller is requesting assistance/information from provider's office.    Patient's Preferred Phone Number on File: [phone]     Additional Information: stated that she is needing labs faxed to [facility] to have  done with her in home. Please advise

## 2024-07-16 NOTE — TELEPHONE ENCOUNTER
----- Message from Leticia Hill LPN sent at 7/16/2024  7:49 AM CDT -----  Regarding: felice Wong 7/23 @11:00  Fasting labs needed.

## 2024-07-16 NOTE — TELEPHONE ENCOUNTER
----- Message from Mariola Alfredo sent at 7/16/2024  9:02 AM CDT -----  Regarding: advice  Who Called: Renata Melchor    Caller is requesting assistance/information from provider's office.    Patient's Preferred Phone Number on File: [phone]     Additional Information: stated that she is needing labs faxed to [facility] to have  done with her in home. Please advise

## 2024-07-17 ENCOUNTER — LAB REQUISITION (OUTPATIENT)
Dept: LAB | Facility: HOSPITAL | Age: 84
End: 2024-07-17
Payer: MEDICARE

## 2024-07-17 DIAGNOSIS — L98.499 NON-PRESSURE CHRONIC ULCER OF SKIN OF OTHER SITES WITH UNSPECIFIED SEVERITY: ICD-10-CM

## 2024-07-17 DIAGNOSIS — R73.9 HYPERGLYCEMIA, UNSPECIFIED: ICD-10-CM

## 2024-07-17 DIAGNOSIS — E66.01 MORBID (SEVERE) OBESITY DUE TO EXCESS CALORIES: ICD-10-CM

## 2024-07-17 DIAGNOSIS — I10 ESSENTIAL (PRIMARY) HYPERTENSION: ICD-10-CM

## 2024-07-17 DIAGNOSIS — N39.0 URINARY TRACT INFECTION, SITE NOT SPECIFIED: ICD-10-CM

## 2024-07-17 LAB
ALBUMIN SERPL-MCNC: 3.5 G/DL (ref 3.4–4.8)
ALBUMIN/GLOB SERPL: 1 RATIO (ref 1.1–2)
ALP SERPL-CCNC: 94 UNIT/L (ref 40–150)
ALT SERPL-CCNC: 93 UNIT/L (ref 0–55)
ANION GAP SERPL CALC-SCNC: 10 MEQ/L
AST SERPL-CCNC: 40 UNIT/L (ref 5–34)
BACTERIA #/AREA URNS AUTO: ABNORMAL /HPF
BASOPHILS # BLD AUTO: 0.06 X10(3)/MCL
BASOPHILS NFR BLD AUTO: 0.6 %
BILIRUB SERPL-MCNC: 0.7 MG/DL
BILIRUB UR QL STRIP.AUTO: NEGATIVE
BUN SERPL-MCNC: 16.8 MG/DL (ref 9.8–20.1)
CALCIUM SERPL-MCNC: 9.1 MG/DL (ref 8.4–10.2)
CHLORIDE SERPL-SCNC: 104 MMOL/L (ref 98–107)
CHOLEST SERPL-MCNC: 118 MG/DL
CHOLEST/HDLC SERPL: 2 {RATIO} (ref 0–5)
CLARITY UR: ABNORMAL
CO2 SERPL-SCNC: 28 MMOL/L (ref 23–31)
COLOR UR AUTO: ABNORMAL
CREAT SERPL-MCNC: 0.84 MG/DL (ref 0.55–1.02)
CREAT/UREA NIT SERPL: 20
EOSINOPHIL # BLD AUTO: 0.29 X10(3)/MCL (ref 0–0.9)
EOSINOPHIL NFR BLD AUTO: 2.8 %
ERYTHROCYTE [DISTWIDTH] IN BLOOD BY AUTOMATED COUNT: 12.9 % (ref 11.5–17)
EST. AVERAGE GLUCOSE BLD GHB EST-MCNC: 134.1 MG/DL
GFR SERPLBLD CREATININE-BSD FMLA CKD-EPI: >60 ML/MIN/1.73/M2
GLOBULIN SER-MCNC: 3.5 GM/DL (ref 2.4–3.5)
GLUCOSE SERPL-MCNC: 127 MG/DL (ref 82–115)
GLUCOSE UR QL STRIP: NEGATIVE
HBA1C MFR BLD: 6.3 %
HCT VFR BLD AUTO: 43.5 % (ref 37–47)
HDLC SERPL-MCNC: 53 MG/DL (ref 35–60)
HGB BLD-MCNC: 14.3 G/DL (ref 12–16)
HGB UR QL STRIP: NEGATIVE
IMM GRANULOCYTES # BLD AUTO: 0.07 X10(3)/MCL (ref 0–0.04)
IMM GRANULOCYTES NFR BLD AUTO: 0.7 %
KETONES UR QL STRIP: NEGATIVE
LDLC SERPL CALC-MCNC: 47 MG/DL (ref 50–140)
LEUKOCYTE ESTERASE UR QL STRIP: ABNORMAL
LYMPHOCYTES # BLD AUTO: 1.61 X10(3)/MCL (ref 0.6–4.6)
LYMPHOCYTES NFR BLD AUTO: 15.5 %
MCH RBC QN AUTO: 32 PG (ref 27–31)
MCHC RBC AUTO-ENTMCNC: 32.9 G/DL (ref 33–36)
MCV RBC AUTO: 97.3 FL (ref 80–94)
MONOCYTES # BLD AUTO: 0.91 X10(3)/MCL (ref 0.1–1.3)
MONOCYTES NFR BLD AUTO: 8.7 %
NEUTROPHILS # BLD AUTO: 7.47 X10(3)/MCL (ref 2.1–9.2)
NEUTROPHILS NFR BLD AUTO: 71.7 %
NITRITE UR QL STRIP: POSITIVE
NRBC BLD AUTO-RTO: 0 %
PH UR STRIP: 6 [PH]
PLATELET # BLD AUTO: 219 X10(3)/MCL (ref 130–400)
PMV BLD AUTO: 9.6 FL (ref 7.4–10.4)
POTASSIUM SERPL-SCNC: 4.2 MMOL/L (ref 3.5–5.1)
PROT SERPL-MCNC: 7 GM/DL (ref 5.8–7.6)
PROT UR QL STRIP: NEGATIVE
RBC # BLD AUTO: 4.47 X10(6)/MCL (ref 4.2–5.4)
RBC #/AREA URNS AUTO: ABNORMAL /HPF
SODIUM SERPL-SCNC: 142 MMOL/L (ref 136–145)
SP GR UR STRIP.AUTO: 1.02 (ref 1–1.03)
SQUAMOUS #/AREA URNS AUTO: ABNORMAL /HPF
TRIGL SERPL-MCNC: 91 MG/DL (ref 37–140)
TSH SERPL-ACNC: 2.86 UIU/ML (ref 0.35–4.94)
UROBILINOGEN UR STRIP-ACNC: 0.2
VLDLC SERPL CALC-MCNC: 18 MG/DL
WBC # BLD AUTO: 10.41 X10(3)/MCL (ref 4.5–11.5)
WBC #/AREA URNS AUTO: ABNORMAL /HPF

## 2024-07-17 PROCEDURE — 85025 COMPLETE CBC W/AUTO DIFF WBC: CPT | Performed by: INTERNAL MEDICINE

## 2024-07-17 PROCEDURE — 87077 CULTURE AEROBIC IDENTIFY: CPT | Performed by: INTERNAL MEDICINE

## 2024-07-17 PROCEDURE — 87186 SC STD MICRODIL/AGAR DIL: CPT | Performed by: INTERNAL MEDICINE

## 2024-07-17 PROCEDURE — 80061 LIPID PANEL: CPT | Performed by: INTERNAL MEDICINE

## 2024-07-17 PROCEDURE — 81001 URINALYSIS AUTO W/SCOPE: CPT | Performed by: INTERNAL MEDICINE

## 2024-07-17 PROCEDURE — 84443 ASSAY THYROID STIM HORMONE: CPT | Performed by: INTERNAL MEDICINE

## 2024-07-17 PROCEDURE — 80053 COMPREHEN METABOLIC PANEL: CPT | Performed by: INTERNAL MEDICINE

## 2024-07-17 PROCEDURE — 81003 URINALYSIS AUTO W/O SCOPE: CPT | Performed by: INTERNAL MEDICINE

## 2024-07-17 PROCEDURE — 87086 URINE CULTURE/COLONY COUNT: CPT | Performed by: INTERNAL MEDICINE

## 2024-07-17 PROCEDURE — 83036 HEMOGLOBIN GLYCOSYLATED A1C: CPT | Performed by: INTERNAL MEDICINE

## 2024-07-19 ENCOUNTER — TELEPHONE (OUTPATIENT)
Dept: INTERNAL MEDICINE | Facility: CLINIC | Age: 84
End: 2024-07-19
Payer: MEDICARE

## 2024-07-19 LAB — BACTERIA UR CULT: ABNORMAL

## 2024-07-19 NOTE — TELEPHONE ENCOUNTER
----- Message from Yanna Oates sent at 7/19/2024 10:09 AM CDT -----  .Type:  Patient Requesting Referral    Who Called:pt  Does the patient already have the specialty appointment scheduled?:no  If yes, what is the date of that appointment?:no  Referral to What Specialty:[facility]  Reason for Referral:back and leg problems and getting weaker  Does the patient want the referral with a specific physician?:[facility]  [facility] the [facility]  Patient Requesting a Response?:Yes  Would the patient rather a call back or a response via [facility]?   Best Call Back Number:[phone]  Additional Information: Please put in orders for in home physical therapy. Please send to [facility] and she wants [facility]

## 2024-07-22 DIAGNOSIS — R42 VERTIGO: Primary | ICD-10-CM

## 2024-07-22 DIAGNOSIS — M25.569 KNEE PAIN, UNSPECIFIED CHRONICITY, UNSPECIFIED LATERALITY: ICD-10-CM

## 2024-07-23 ENCOUNTER — OFFICE VISIT (OUTPATIENT)
Dept: INTERNAL MEDICINE | Facility: CLINIC | Age: 84
End: 2024-07-23
Payer: MEDICARE

## 2024-07-23 VITALS
BODY MASS INDEX: 37.21 KG/M2 | HEART RATE: 74 BPM | OXYGEN SATURATION: 91 % | DIASTOLIC BLOOD PRESSURE: 70 MMHG | WEIGHT: 210 LBS | RESPIRATION RATE: 18 BRPM | HEIGHT: 63 IN | SYSTOLIC BLOOD PRESSURE: 122 MMHG

## 2024-07-23 DIAGNOSIS — R79.89 ABNORMAL LIVER FUNCTION TESTS: ICD-10-CM

## 2024-07-23 DIAGNOSIS — I10 HYPERTENSION, UNSPECIFIED TYPE: Primary | ICD-10-CM

## 2024-07-23 DIAGNOSIS — L98.499 SKIN ULCER, UNSPECIFIED ULCER STAGE: ICD-10-CM

## 2024-07-23 DIAGNOSIS — N32.81 OVERACTIVE BLADDER: ICD-10-CM

## 2024-07-23 DIAGNOSIS — R73.9 HYPERGLYCEMIA: ICD-10-CM

## 2024-07-23 DIAGNOSIS — E66.01 SEVERE OBESITY (BMI 35.0-39.9) WITH COMORBIDITY: ICD-10-CM

## 2024-07-23 DIAGNOSIS — R35.0 URINARY FREQUENCY: ICD-10-CM

## 2024-07-23 DIAGNOSIS — I65.23 BILATERAL CAROTID ARTERY STENOSIS: ICD-10-CM

## 2024-07-23 PROCEDURE — 99214 OFFICE O/P EST MOD 30 MIN: CPT | Mod: ,,, | Performed by: INTERNAL MEDICINE

## 2024-07-23 RX ORDER — MIRABEGRON 25 MG/1
25 TABLET, FILM COATED, EXTENDED RELEASE ORAL DAILY
Qty: 30 TABLET | Refills: 11 | Status: SHIPPED | OUTPATIENT
Start: 2024-07-23 | End: 2025-07-23

## 2024-08-30 ENCOUNTER — TELEPHONE (OUTPATIENT)
Dept: INTERNAL MEDICINE | Facility: CLINIC | Age: 84
End: 2024-08-30
Payer: MEDICARE
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
## 2024-09-03 DIAGNOSIS — S30.861A INSECT BITE OF ABDOMINAL WALL, INITIAL ENCOUNTER: Primary | ICD-10-CM

## 2024-09-03 RX ORDER — MUPIROCIN 20 MG/G
OINTMENT TOPICAL 2 TIMES DAILY
Qty: 15 G | Refills: 1 | Status: SHIPPED | OUTPATIENT
Start: 2024-09-03